# Patient Record
Sex: MALE | Race: WHITE | Employment: OTHER | URBAN - METROPOLITAN AREA
[De-identification: names, ages, dates, MRNs, and addresses within clinical notes are randomized per-mention and may not be internally consistent; named-entity substitution may affect disease eponyms.]

---

## 2017-02-14 ENCOUNTER — OFFICE VISIT (OUTPATIENT)
Dept: FAMILY MEDICINE CLINIC | Age: 62
End: 2017-02-14

## 2017-02-14 VITALS
SYSTOLIC BLOOD PRESSURE: 127 MMHG | HEART RATE: 103 BPM | DIASTOLIC BLOOD PRESSURE: 81 MMHG | OXYGEN SATURATION: 99 % | HEIGHT: 66 IN | RESPIRATION RATE: 18 BRPM | BODY MASS INDEX: 25.95 KG/M2 | WEIGHT: 161.44 LBS | TEMPERATURE: 97.9 F

## 2017-02-14 DIAGNOSIS — M06.9 RHEUMATOID ARTHRITIS, INVOLVING UNSPECIFIED SITE, UNSPECIFIED RHEUMATOID FACTOR PRESENCE: Primary | ICD-10-CM

## 2017-02-14 DIAGNOSIS — G89.29 CHRONIC PAIN OF LEFT KNEE: ICD-10-CM

## 2017-02-14 DIAGNOSIS — M25.562 CHRONIC PAIN OF LEFT KNEE: ICD-10-CM

## 2017-02-14 RX ORDER — LEFLUNOMIDE 20 MG/1
20 TABLET ORAL DAILY
COMMUNITY
End: 2017-03-02 | Stop reason: ALTCHOICE

## 2017-02-14 RX ORDER — TRAMADOL HYDROCHLORIDE 50 MG/1
50 TABLET ORAL
COMMUNITY
End: 2017-07-06 | Stop reason: ALTCHOICE

## 2017-02-14 RX ORDER — NAPROXEN 500 MG/1
500 TABLET ORAL 2 TIMES DAILY WITH MEALS
Qty: 60 TAB | Refills: 5 | Status: SHIPPED | OUTPATIENT
Start: 2017-02-14 | End: 2021-05-03

## 2017-02-14 NOTE — MR AVS SNAPSHOT
Visit Information Date & Time Provider Department Dept. Phone Encounter #  
 2/14/2017  2:00 PM Bushra Hutchins MD [address] [facility] [phone] [id number] Follow-up Instructions Return if symptoms worsen or fail to improve. Upcoming Health Maintenance Date Due Hepatitis C Screening 1955 DTaP/Tdap/Td series (1 - Tdap) 4/3/1976 FOBT Q 1 YEAR AGE 50-75 4/3/2005 ZOSTER VACCINE AGE 60> 4/3/2015 Allergies as of 2/14/2017  Review Complete On: 2/14/2017 By: Bushra Hutchins MD  
  
 Severity Noted Reaction Type Reactions Codeine  02/14/2017    Nausea and Vomiting Current Immunizations  Never Reviewed No immunizations on file. Not reviewed this visit You Were Diagnosed With   
  
 Codes Comments Rheumatoid arthritis, involving unspecified site, unspecified rheumatoid factor presence ([facility]    -  Primary ICD-10-CM: M06.9 ICD-9-CM: 714.0 Chronic pain of left knee     ICD-10-CM: M25.562, G89.29 ICD-9-CM: 719.46, 338.29 Lung nodule < 6cm on CT     ICD-10-CM: R91.1 ICD-9-CM: 793.11 Vitals BP Pulse Temp Resp Height(growth percentile) Weight(growth percentile) 127/81 (BP 1 Location: Left arm, BP Patient Position: Sitting) (!) 103 97.9 °F (36.6 °C) (Oral) 18 5' 6\" (1.676 m) 161 lb 7 oz (73.2 kg) SpO2 BMI Smoking Status 99% 26.06 kg/m2 Current Every Day Smoker Vitals History BMI and BSA Data Body Mass Index Body Surface Area 26.06 kg/m 2 1.85 m 2 Preferred Pharmacy Pharmacy Name Phone [facility] [phone] [facility] [phone] Your Updated Medication List  
  
   
This list is accurate as of: 2/14/17  4:40 PM.  Always use your most recent med list.  
  
  
  
  
 leflunomide 20 mg tablet Commonly known as:  Jeramie Hinsdale Take 20 mg by mouth daily. naproxen 500 mg tablet Commonly known as:  NAPROSYN Take 1 Tab by mouth two (2) times daily (with meals). Indications: RHEUMATOID ARTHRITIS  
  
 traMADol 50 mg tablet Commonly known as:  ULTRAM  
Take 50 mg by mouth every six (6) hours as needed for Pain. Prescriptions Sent to Pharmacy Refills  
 naproxen (NAPROSYN) 500 mg tablet 5 Sig: Take 1 Tab by mouth two (2) times daily (with meals). Indications: RHEUMATOID ARTHRITIS Class: Normal  
 Pharmacy: [facility] [address] [facility] #: [phone] Route: Oral  
  
We Performed the Following REFERRAL TO ORTHOPEDICS [UJF323 Custom] Comments:  
 Please evaluate patient for knee REFERRAL TO PODIATRY [REF90 Custom] REFERRAL TO RHEUMATOLOGY [ILZ18 Custom] Follow-up Instructions Return if symptoms worsen or fail to improve. Referral Information Referral ID Referred By Referred To  
  
 [id number] Oneal MICHAEL MD   
   [address] [facility], [address] [place] [address] Road Phone: [phone] Fax: [phone] Visits Status Start Date End Date 1 New Request 2/14/17 2/14/18 If your referral has a status of pending review or denied, additional information will be sent to support the outcome of this decision. Referral ID Referred By Referred To  
 [id number] [facility], [address]  [address] [place] [address] Visits Status Start Date End Date 1 New Request 2/14/17 2/14/18 If your referral has a status of pending review or denied, additional information will be sent to support the outcome of this decision. Referral ID Referred By Referred To  
 [id number] [facility], [address] Phone: [phone] Fax: [phone] Visits Status Start Date End Date 1 New Request 2/14/17 2/14/18 If your referral has a status of pending review or denied, additional information will be sent to support the outcome of this decision. Introducing [facility] SERVICES! Elisabeth Tavares introduces [employer] patient portal. Now you can access parts of your medical record, email your doctor's office, and request medication refills online. 1. In your internet browser, go to [email or web address] 2. Click on the First Time User? Click Here link in the Sign In box. You will see the New Member Sign Up page. 3. Enter your BioArray Access Code exactly as it appears below. You will not need to use this code after youve completed the sign-up process. If you do not sign up before the expiration date, you must request a new code. · BioArray Access Code: [email or web address] Expires: 5/15/2017  2:13 PM 
 
4. Enter the last four digits of your Social Security Number (xxxx) and Date of Birth (mm/dd/yyyy) as indicated and click Submit. You will be taken to the next sign-up page. 5. Create a BioArray ID. This will be your BioArray login ID and cannot be changed, so think of one that is secure and easy to remember. 6. Create a BioArray password. You can change your password at any time. 7. Enter your Password Reset Question and Answer. This can be used at a later time if you forget your password. 8. Enter your e-mail address. You will receive e-mail notification when new information is available in [address] 9. Click Sign Up. You can now view and download portions of your medical record. 10. Click the Download Summary menu link to download a portable copy of your medical information. If you have questions, please visit the Frequently Asked Questions section of the BioArray website. Remember, BioArray is NOT to be used for urgent needs. For medical emergencies, dial 911. Now available from your iPhone and Android! Please provide this summary of care documentation to your next provider. Your primary care clinician is listed as Annamarie Graves. If you have any questions after today's visit, please call [phone].

## 2017-02-14 NOTE — PROGRESS NOTES
1. Have you been to the ER, urgent care clinic since your last visit? Hospitalized since your last visit? No    2. Have you seen or consulted any other health care providers outside of the [address] [place] [address] since your last visit? Include any pap smears or colon screening. No   Chief Complaint   Patient presents with    New Patient     New Patient- establish care- left knee pain, right foot toes has arthritis knots  & numbness going up right leg       See scanned labs    Pt with RA and has R foot and left knee issues and would like to see rheum, hx of lung nodules-see CT report    Chief Complaint   Patient presents with    New Patient     New Patient- establish care- left knee pain, right foot toes has arthritis knots  & numbness going up right leg     he is a 64y.o. year old male who presents for evalution. Reviewed PmHx, RxHx, FmHx, SocHx, AllgHx and updated and dated in the chart. Patient Active Problem List    Diagnosis    [facility] ([facility]    Lung nodule < 6cm on CT       Review of Systems - negative except as listed above in the HPI    Objective:     Vitals:    02/14/17 1409   BP: 127/81   Pulse: (!) 103   Resp: 18   Temp: 97.9 °F (36.6 °C)   TempSrc: Oral   SpO2: 99%   Weight: 161 lb 7 oz (73.2 kg)   Height: 5' 6\" (1.676 m)     Physical Examination: General appearance - alert, well appearing, and in no distress  Neck - supple, no significant adenopathy  Chest - clear to auscultation, no wheezes, rales or rhonchi, symmetric air entry  Heart - normal rate, regular rhythm, normal S1, S2, no murmurs, rubs, clicks or gallops  Musculoskeletal--left knee with crep, hands with mod deform and nodules, R foot with nodules        Assessment/ Plan:   Kalina Fulton was seen today for new patient.     Diagnoses and all orders for this visit:    Rheumatoid arthritis, involving unspecified site, unspecified rheumatoid factor presence ([facility]  -     REFERRAL TO Jorge Aburto  - naproxen (NAPROSYN) 500 mg tablet; Take 1 Tab by mouth two (2) times daily (with meals). Indications: RHEUMATOID ARTHRITIS    Chronic pain of left knee  -     REFERRAL TO ORTHOPEDICS  -     naproxen (NAPROSYN) 500 mg tablet; Take 1 Tab by mouth two (2) times daily (with meals). Indications: RHEUMATOID ARTHRITIS    Lung nodule < 6cm on CT  -see report  -repeat in one year       Follow-up Disposition:  Return if symptoms worsen or fail to improve. I have discussed the diagnosis with the patient and the intended plan as seen in the above orders. The patient understands and agrees with the plan. The patient has received an after-visit summary and questions were answered concerning future plans. Medication Side Effects and Warnings were discussed with patient  Patient Labs were reviewed and or requested:  Patient Past Records were reviewed and or requested    Subhash Hansen M.D. There are no Patient Instructions on file for this visit.

## 2017-02-27 ENCOUNTER — DOCUMENTATION ONLY (OUTPATIENT)
Dept: FAMILY MEDICINE CLINIC | Age: 62
End: 2017-02-27

## 2017-02-27 NOTE — PROGRESS NOTES
[facility] records ,signed by PCP and sent to central scanning to be scanned into chart.

## 2017-03-02 ENCOUNTER — OFFICE VISIT (OUTPATIENT)
Dept: FAMILY MEDICINE CLINIC | Age: 62
End: 2017-03-02

## 2017-03-02 VITALS
HEIGHT: 66 IN | DIASTOLIC BLOOD PRESSURE: 80 MMHG | SYSTOLIC BLOOD PRESSURE: 120 MMHG | RESPIRATION RATE: 22 BRPM | OXYGEN SATURATION: 97 % | WEIGHT: 159 LBS | BODY MASS INDEX: 25.55 KG/M2 | HEART RATE: 94 BPM | TEMPERATURE: 97.8 F

## 2017-03-02 DIAGNOSIS — J40 BRONCHITIS: Primary | ICD-10-CM

## 2017-03-02 RX ORDER — PREDNISONE 10 MG/1
TABLET ORAL
Qty: 1 PACKAGE | Refills: 0 | Status: SHIPPED | OUTPATIENT
Start: 2017-03-02 | End: 2017-07-06 | Stop reason: ALTCHOICE

## 2017-03-02 RX ORDER — ALBUTEROL SULFATE 90 UG/1
2 AEROSOL, METERED RESPIRATORY (INHALATION)
Qty: 1 INHALER | Refills: 5 | Status: SHIPPED | OUTPATIENT
Start: 2017-03-02 | End: 2017-07-06

## 2017-03-02 RX ORDER — CEFDINIR 300 MG/1
300 CAPSULE ORAL 2 TIMES DAILY
Qty: 20 CAP | Refills: 0 | Status: SHIPPED | OUTPATIENT
Start: 2017-03-02 | End: 2017-03-12

## 2017-03-02 NOTE — PROGRESS NOTES
Nasal congestion , cough x 3 weeks. [facility] to [facility], on prednisone dose pack, ending tomorrow, antibiotics will end on 3/8/2017, Bactrum. Patient states he has already taken amoxicillin x 10 days prior to current regimen. 1. Have you been to the ER, urgent care clinic since your last visit? Hospitalized since your last visit? No    2. Have you seen or consulted any other health care providers outside of the [address] [place] [address] since your last visit? Include any pap smears or colon screening. No       Chief Complaint   Patient presents with    Nasal Congestion     on 2 nd round of antibiotics, cough congestion x 3 weeks. he is a 64y.o. year old male who presents for evalution. Reviewed PmHx, RxHx, FmHx, SocHx, AllgHx and updated and dated in the chart. Patient Active Problem List    Diagnosis    Rheumatoid arthritis ([facility]    Lung nodule < 6cm on CT       Review of Systems - negative except as listed above in the HPI    Objective:     Vitals:    03/02/17 1445   BP: 120/80   Pulse: 94   Resp: 22   Temp: 97.8 °F (36.6 °C)   SpO2: 97%   Weight: 159 lb (72.1 kg)   Height: 5' 6\" (1.676 m)     Physical Examination: General appearance - alert, well appearing, and in no distress  Chest - rhonchi noted R side  Heart - normal rate, regular rhythm, normal S1, S2, no murmurs, rubs, clicks or gallops      Assessment/ Plan:   Geraldine Nunez was seen today for nasal congestion. Diagnoses and all orders for this visit:    Bronchitis  -     cefdinir (OMNICEF) 300 mg capsule; Take 1 Cap by mouth two (2) times a day for 10 days. -     predniSONE (STERAPRED DS) 10 mg dose pack; 12 day DS taper pack as directed  -     albuterol (PROVENTIL HFA, VENTOLIN HFA, PROAIR HFA) 90 mcg/actuation inhaler; Take 2 Puffs by inhalation every four (4) hours as needed for Wheezing for up to 360 days. Follow-up Disposition:  Return if symptoms worsen or fail to improve.     I have discussed the diagnosis with the patient and the intended plan as seen in the above orders. The patient understands and agrees with the plan. The patient has received an after-visit summary and questions were answered concerning future plans. Medication Side Effects and Warnings were discussed with patient  Patient Labs were reviewed and or requested:  Patient Past Records were reviewed and or requested    Antonio Rodrigues M.D. There are no Patient Instructions on file for this visit.           \

## 2017-03-02 NOTE — MR AVS SNAPSHOT
Visit Information Date & Time Provider Department Dept. Phone Encounter #  
 3/2/2017  2:40 PM Jayy Guevara MD [address] [facility] [phone] [id number] Follow-up Instructions Return if symptoms worsen or fail to improve. Upcoming Health Maintenance Date Due Hepatitis C Screening 1955 Pneumococcal 19-64 Medium Risk (1 of 1 - PPSV23) 4/3/1974 DTaP/Tdap/Td series (1 - Tdap) 4/3/1976 FOBT Q 1 YEAR AGE 50-75 4/3/2005 ZOSTER VACCINE AGE 60> 4/3/2015 Allergies as of 3/2/2017  Review Complete On: 3/2/2017 By: Jayy Guevara MD  
  
 Severity Noted Reaction Type Reactions Codeine  02/14/2017    Nausea and Vomiting Current Immunizations  Never Reviewed No immunizations on file. Not reviewed this visit You Were Diagnosed With   
  
 Codes Comments Bronchitis    -  Primary ICD-10-CM: P50 ICD-9-CM: 528 Vitals BP  
  
  
  
  
  
 120/80 Vitals History BMI and BSA Data Body Mass Index Body Surface Area  
 25.66 kg/m 2 1.83 m 2 Preferred Pharmacy Pharmacy Name Phone [facility] [address] AT [facility] OF  [facility] [phone] Your Updated Medication List  
  
   
This list is accurate as of: 3/2/17  3:07 PM.  Always use your most recent med list.  
  
  
  
  
 albuterol 90 mcg/actuation inhaler Commonly known as:  PROVENTIL HFA, VENTOLIN HFA, PROAIR HFA Take 2 Puffs by inhalation every four (4) hours as needed for Wheezing for up to 360 days. cefdinir 300 mg capsule Commonly known as:  OMNICEF Take 1 Cap by mouth two (2) times a day for 10 days. naproxen 500 mg tablet Commonly known as:  NAPROSYN Take 1 Tab by mouth two (2) times daily (with meals). Indications: RHEUMATOID ARTHRITIS  
  
 predniSONE 10 mg dose pack Commonly known as:  STERAPRED DS  
12 day DS taper pack as directed  
  
 traMADol 50 mg tablet Commonly known as:  ULTRAM  
Take 50 mg by mouth every six (6) hours as needed for Pain. Prescriptions Printed Refills  
 cefdinir (OMNICEF) 300 mg capsule 0 Sig: Take 1 Cap by mouth two (2) times a day for 10 days. Class: Print Route: Oral  
 predniSONE (STERAPRED DS) 10 mg dose pack 0 Si day DS taper pack as directed Class: Print  
 albuterol (PROVENTIL HFA, VENTOLIN HFA, PROAIR HFA) 90 mcg/actuation inhaler 5 Sig: Take 2 Puffs by inhalation every four (4) hours as needed for Wheezing for up to 360 days. Class: Print Route: Inhalation Follow-up Instructions Return if symptoms worsen or fail to improve. Introducing [facility]! Lissy Cuevas introduces [employer] patient portal. Now you can access parts of your medical record, email your doctor's office, and request medication refills online. 1. In your internet browser, go to [email or web address] 2. Click on the First Time User? Click Here link in the Sign In box. You will see the New Member Sign Up page. 3. Enter your [employer] Access Code exactly as it appears below. You will not need to use this code after youve completed the sign-up process. If you do not sign up before the expiration date, you must request a new code. · [employer] Access Code: [email or web address] Expires: 5/15/2017  2:13 PM 
 
4. Enter the last four digits of your Social Security Number (xxxx) and Date of Birth (mm/dd/yyyy) as indicated and click Submit. You will be taken to the next sign-up page. 5. Create a Clutch.iot ID. This will be your [employer] login ID and cannot be changed, so think of one that is secure and easy to remember. 6. Create a [employer]utch[employer]iot password. You can change your password at any time. 7. Enter your Password Reset Question and Answer. This can be used at a later time if you forget your password. 8. Enter your e-mail address.  You will receive e-mail notification when new information is available in [employer]. 9. Click Sign Up. You can now view and download portions of your medical record. 10. Click the Download Summary menu link to download a portable copy of your medical information. If you have questions, please visit the Frequently Asked Questions section of the [employer] website. Remember, Un[employer] is NOT to be used for urgent needs. For medical emergencies, dial 911. Now available from your iPhone and Android! Please provide this summary of care documentation to your next provider. Your primary care clinician is listed as Elan Andrea. If you have any questions after today's visit, please call [phone].

## 2017-07-03 ENCOUNTER — TELEPHONE (OUTPATIENT)
Dept: RHEUMATOLOGY | Age: 62
End: 2017-07-03

## 2017-07-06 ENCOUNTER — OFFICE VISIT (OUTPATIENT)
Dept: RHEUMATOLOGY | Age: 62
End: 2017-07-06

## 2017-07-06 VITALS
SYSTOLIC BLOOD PRESSURE: 118 MMHG | HEART RATE: 94 BPM | OXYGEN SATURATION: 97 % | HEIGHT: 66 IN | WEIGHT: 167 LBS | BODY MASS INDEX: 26.84 KG/M2 | RESPIRATION RATE: 18 BRPM | DIASTOLIC BLOOD PRESSURE: 99 MMHG | TEMPERATURE: 96.7 F

## 2017-07-06 DIAGNOSIS — R59.0 HILAR LYMPHADENOPATHY: ICD-10-CM

## 2017-07-06 DIAGNOSIS — M19.041 PRIMARY OSTEOARTHRITIS OF BOTH HANDS: ICD-10-CM

## 2017-07-06 DIAGNOSIS — Z72.0 TOBACCO ABUSE: ICD-10-CM

## 2017-07-06 DIAGNOSIS — J84.10 PULMONARY FIBROSIS (HCC): ICD-10-CM

## 2017-07-06 DIAGNOSIS — M19.042 PRIMARY OSTEOARTHRITIS OF BOTH HANDS: ICD-10-CM

## 2017-07-06 DIAGNOSIS — M05.79 SEROPOSITIVE RHEUMATOID ARTHRITIS OF MULTIPLE SITES (HCC): Primary | ICD-10-CM

## 2017-07-06 NOTE — PROGRESS NOTES
REASON FOR VISIT    This is the initial evaluation for Mr. Rolene Boeck a 58 y.o.  male for question of an inflammatory arthritis. The patient is referred to the Arthritis and [address] [place] [address] at the request of Dr. Samanta Resendiz. HISTORY OF PRESENT ILLNESS      I have reviewed and summarized old records from [address] [place] [address] and [facility]    In 2016, he developed painful and nodular 1st MTP. He had tingling that extending proximally. He saw podiatry who prescribed him medication which did not help but he does not remember. He is a smoker of 40 years. In 5/03/2016, labs showed positive rheumatoid 527, negative BRIAN direct, Lyme, uric acid 6.0 mg/dL    In 5/06/2016, CT Low Dose Cancer screening without contrast showed mild nonspecific left apical opacities may represent scarring. There are three 4 mm lung nodules (series 4, images 197, 79, 65). Fibrotic changes seen in the lungs. Nonspecific left upper lobe longitudinal subpleural opacity may represent postsurgical change or scarring. No other significant lung abnormality. The central airways are patent. Enlarged right hilar lymph node measuring 16 mm (series 3, image 113). There are several other prominent but nonenlarged mediastinal lymph nodes. No pleural or pericardial effusion. Slightly nodular contour of the liver. Mild nonspecific thickening of the left adrenal gland. Small hiatal hernia. Small right renal calculi. No destructive osseous lesion. In 7/18/2016, he was evaluated by [facility] Rheumatology. He had some joint pains. Labs showed positive anti-CCP >300, CRP 0.6 mg//L, negative HCV, HBsAg, HBsAb. He was started on prednisone, which helped. In 8/15/2016, he followed with rheumatology and was started on leflunomide. He tolerated lelfunomide but stopped it after around 3 weeks due to fear of side effects. In 3/02/2017, he was given a prednisone dose pack for an upper respiratory tract infection prior to his joint pain.     In 3/2017, he developed pain, swelling, swelling lasting 2 hours in hands and wrists joints. He has taken naproxen 500 mg twice daily. He developed nodules. Today, he feels stiffness lasting hours, pain and swelling in his hands which he felt has improved. He also has nodules that have been painless but growing. He continues to smoke. Therapy History includes:    Current DMARD therapy includes: none  Prior DMARD therapy includes: leflunomide  The following DMARDs have been ineffective: none  The following DMARDs were stopped because of side effects: none    REVIEW OF SYSTEMS    A 15 point review of systems was performed and summarized below. The questionnaire was reviewed with the patient and scanned into the patient's medical record.     General: denies recent weight gain, recent weight loss, fatigue, weakness, fever, night sweats  Musculoskeletal: endorses joint pain, joint swelling, morning stiffness (lasting 120 minutes), denies muscle weakness  Ears: denies ringing in ears, loss of hearing, deafness  Eyes: endorses loss of vision, denies pain, redness, double vision, blurred vision, dryness, foreign body sensation  Mouth: denies sore tongue, oral ulcers, bleeding gums, loss of taste, dryness, increased dental caries  Nose: denies nosebleeds, loss of smell, nasal ulcers  Throat: denies frequent sore throats, hoarseness, difficulty in swallowing, pain in jaw while chewing  Neck: denies swollen glands, tender glands  Cardiopulmonary: denies pain in chest, irregular heart beat, sudden changes in heart beat, shortness of breath, difficulty breathing at night, swollen legs or feet, cough, coughing of blood, wheezing  Gastrointestinal: denies nausea, heartburn, stomach pain relieved by food, vomiting of blood/\"coffee grounds\", jaundice, increasing constipation, persistent diarrhea, blood in stools, black stools  Genitourinary: denies getting up at night to pass urine, difficult urination, pain or burning on urination, blood in urine, cloudy urine, pus in urine, genital discharge, frequent urination, rash/ulcers, sexual difficulties, prostate trouble  Hematologic: denies anemia, bleeding tendency, blood clots  Skin: endorses nodules/bumps, denies easy bruising, redness, rash, hives, sun sensitive, skin tightness, hair loss, color changes of hands or feet in the cold (Raynaud's)  Neurologic: endorses numbness or tingling in hands/feet, denies headaches, dizziness, fainting of loss of consciousness,  memory loss, muscle weakness  Psychiatric: denies depression, excessive worries  Sleep: denies poor sleep (7 hours), snoring, daytime somnolence, difficulty falling asleep, difficulty staying asleep     PAST MEDICAL HISTORY    He has a past medical history of Rheumatoid arthritis ([facility] [address] FAMILY HISTORY    His family history includes Arthritis-rheumatoid in his cousin; Cancer in his mother; Glaucoma in his mother; Kidney Disease in his father. SOCIAL HISTORY    He reports that he has been smoking. He has never used smokeless tobacco. He reports that he does not drink alcohol or use illicit drugs. HEALTH MAINTENANCE    Immunizations    There is no immunization history on file for this patient. MEDICATIONS    Current Outpatient Prescriptions   Medication Sig Dispense Refill    naproxen (NAPROSYN) 500 mg tablet Take 1 Tab by mouth two (2) times daily (with meals). Indications: RHEUMATOID ARTHRITIS 60 Tab 5       ALLERGIES    Allergies   Allergen Reactions    Codeine Nausea and Vomiting       PHYSICAL EXAMINATION    Visit Vitals    BP (!) 118/99 (BP 1 Location: Right arm, BP Patient Position: Sitting)    Pulse 94    Temp 96.7 °F (35.9 °C)    Resp 18    Ht 5' 6\" (1.676 m)    Wt 167 lb (75.8 kg)    SpO2 97%    BMI 26.95 kg/m2     Body mass index is 26.95 kg/(m^2).     General: Patient is alert, oriented x 3, not in acute distress    HEENT:   Conjunctiva are not injected and appear moist, oral mucous membranes are moist, there are no ulcers present, there is no alopecia, neck is supple, there is no lymphadenopathy. Salivary glands are normal    Cardiovascular:  Heart is regular rate and rhythm, no murmurs. Chest:  Bibasilar crackles    Extremities:  Free of clubbing, cyanosis, edema, extremities well perfused. Neurological exam:  No focal sensory deficits, muscle strength is full in upper and lower extremities. Skin exam:  There are no rashes, no tophi, no psoriasis, no active Raynaud's, no livedo reticularis, no periungual erythema. Rheumatoid Nodules: LEFT: olecranon, IP, 1st MTP and 5th MTP. RIGHT: IP, 3rd IP, 1st MTP, 1st IP and 5th proximal MT     Musculoskeletal exam:  A comprehensive musculoskeletal exam was performed for all joints of each upper and lower extremity and assessed for swelling, tenderness and range of motion. Pertinent results are documented as below:    Bilateral [place] and [place] nodes.     Joint Count 7/6/2017   Patient pain (0-100) 75   MHAQ 0.125   Left wrist- Swollen 1   Left 1st MCP - Tender 1   Left 1st MCP - Swollen 1   Left 2nd MCP - Tender 1   Left 2nd MCP - Swollen 1   Left 3rd MCP - Swollen 1   Left 5th MCP - Swollen 1   Left 2nd PIP - Swollen 1   Left 3rd PIP - Swollen 1   Left 4th PIP - Swollen 1   Left 5th PIP - Swollen 1   Right wrist- Swollen 1   Right 1st MCP - Tender 1   Right 1st MCP - Swollen 1   Right 2nd MCP - Tender 1   Right 2nd MCP - Swollen 1   Right 3rd MCP - Tender 1   Right 3rd MCP - Swollen 1   Right 4th MCP - Tender 1   Right 4th MCP - Swollen 1   Right 5th MCP - Tender 1   Right 5th MCP - Swollen 1   Right 2nd PIP - Swollen 1   Right 3rd PIP - Swollen 1   Right 4th PIP - Tender 1   Right 4th PIP - Swollen 1   Right 5th PIP - Swollen 1   Tender Joint Count (Total) 8   Swollen Joint Count (Total) 19   Physician Assessment (0-10) 5     DATA REVIEW    Prior medical records were reviewed and are summarized as below:    Laboratory data: summarized in the HPI    Imaging: summarized in the HPI. ASSESSMENT AND PLAN    1) Seropositive Erosive Nodular Rheumatoid Arthritis. I had a long discussion with him about the importance of DMARD treatment. He had been on leflunomide with good tolerance but stopped it out of fear of immunosuppression and not because of side effects. Either it or methotrexate would be a good option if no renal impairment. I discussed with him the risk for smoking and the poor prognostic factor associated with it. I also counseled against surgical removal of this nodules due to risk for infection. I offered prednisone but he declined. I ordered labs and radiographs and will have him follow up in 2 weeks to discuss DMARDs. 2) Pulmonary Fibrosis. This was seen incidentally on a CT chest cancer screening. The description was non-specific and just \"fibrotic changes seen in lungs\". No mention of location or pattern. The question is whether this is from smoking, inhaled chemicals from work, or due to Rheumatoid Arthritis. I would like a high resolution CT chest but he declined. 3) Tobacco Abuse. He is a smoker of 40 years. I counseled smoking cessation due to #1. 4) Hilar Lymphoadenopathy. Chest CT showed enlarged right hilar lymph node measuring 16 mm. There are several other prominent but nonenlarged mediastinal lymph nodes. He declined high resolution CT chest.    5) Bilateral Hand Osteoarthritis. The patient has osteoarthritis, which is also known as \"wear and tear arthritis,\" non-inflammatory arthritis or mechanical arthritis. There are hereditary, vocational and posttraumatic joint injuries predisposing factors. I recommend non-pharmacologic modalities such as keeping hands warm, avoid activities that case pain, warm water soaks, in addition to (2) pharmacologic modalities such as acetaminophen as first line, oral and topical NSAIDs as second line.  Naproxen is a low WATERS-2 selectivity inhibitor that poses lower cardiovascular risk than other NSAIDs (Angiolillo Francesco LOPEZ. Clinical Pharmacology and Cardiovascular Safety of Naproxen. Am J Cardiovasc Drugs. 2016 Nov 8). NSAIDs should not be used in patients on blood thinners, chronic kidney disease, high risk coronary artery disease, and inflammatory bowel disease (ulcerative colitis or Crohn's disease)      The patient voiced understanding of the aforementioned assessment and plan. Summary of plan was provided in the After Visit Summary patient instructions. I also provided education about MyChart setup and utility.     TODAY'S ORDERS    Orders Placed This Encounter    QUANTIFERON TB GOLD    XR FOOT LT MIN 3 V    XR FOOT RT MIN 3 V    XR HAND LT MIN 3 V    XR HAND RT MIN 3 V    CBC WITH AUTOMATED DIFF    CHRONIC HEPATITIS PANEL    METABOLIC PANEL, COMPREHENSIVE    C REACTIVE PROTEIN, QT    SED RATE (ESR)    PROTEIN ELECTROPHORESIS W/ REFLX OFELIA       Future Appointments  Date Time Provider Kelton Georgie   7/20/2017 8:40 AM MD Daniel Varma MD, [address] [facility]    Adult Rheumatology   Musculoskeletal Ultrasound Certified  [address] [place] [address], [address] [place] [address]   Phone [phone]  Fax [phone]

## 2017-07-06 NOTE — MR AVS SNAPSHOT
Visit Information Date & Time Provider Department Dept. Phone Encounter #  
 7/6/2017 11:00 AM Jadiel Burns MD Arthritis and [address] [place] [address] [id number] Follow-up Instructions Return in about 2 weeks (around 7/20/2017). Upcoming Health Maintenance Date Due Hepatitis C Screening 1955 Pneumococcal 19-64 Medium Risk (1 of 1 - PPSV23) 4/3/1974 DTaP/Tdap/Td series (1 - Tdap) 4/3/1976 FOBT Q 1 YEAR AGE 50-75 4/3/2005 ZOSTER VACCINE AGE 60> 4/3/2015 INFLUENZA AGE 9 TO ADULT 8/1/2017 Allergies as of 7/6/2017  Review Complete On: 7/6/2017 By: Jadiel Burns MD  
  
 Severity Noted Reaction Type Reactions Codeine  02/14/2017    Nausea and Vomiting Current Immunizations  Never Reviewed No immunizations on file. Not reviewed this visit You Were Diagnosed With   
  
 Codes Comments Seropositive rheumatoid arthritis of multiple sites [facility])    -  Primary ICD-10-CM: M05.79 ICD-9-CM: 714.0 Vitals BP Pulse Temp Resp Height(growth percentile) Weight(growth percentile) (!) 118/99 (BP 1 Location: Right arm, BP Patient Position: Sitting) 94 96.7 °F (35.9 °C) 18 5' 6\" (1.676 m) 167 lb (75.8 kg) SpO2 BMI Smoking Status 97% 26.95 kg/m2 Current Every Day Smoker BMI and BSA Data Body Mass Index Body Surface Area  
 26.95 kg/m 2 1.88 m 2 Preferred Pharmacy Pharmacy Name Phone [facility], [address] AT [facility] OF  [facility] [phone] Your Updated Medication List  
  
   
This list is accurate as of: 7/6/17 11:57 AM.  Always use your most recent med list.  
  
  
  
  
 naproxen 500 mg tablet Commonly known as:  NAPROSYN Take 1 Tab by mouth two (2) times daily (with meals). Indications: RHEUMATOID ARTHRITIS We Performed the Following C REACTIVE PROTEIN, QT [13194 CPT(R)] CBC WITH AUTOMATED DIFF [37930 CPT(R)] CHRONIC HEPATITIS PANEL [[id number] Custom] METABOLIC PANEL, COMPREHENSIVE [67683 CPT(R)] PROTEIN ELECTROPHORESIS W/ REFLX OFELIA [[id number] Custom] QUANTIFERON TB GOLD [[id number] Custom] SED RATE (ESR) [id number] CPT(R)] Follow-up Instructions Return in about 2 weeks (around 7/20/2017). To-Do List   
 07/06/2017 Imaging:  XR FOOT LT MIN 3 V   
  
 07/06/2017 Imaging:  XR FOOT RT MIN 3 V   
  
 07/06/2017 Imaging:  XR HAND LT MIN 3 V   
  
 07/06/2017 Imaging:  XR HAND RT MIN 3 V Introducing [facility] & HEALTH SERVICES! [employer] introduces [employer] patient portal. Now you can access parts of your medical record, email your doctor's office, and request medication refills online. 1. In your internet browser, go to [email or web address] 2. Click on the First Time User? Click Here link in the Sign In box. You will see the New Member Sign Up page. 3. Enter your [employer] Access Code exactly as it appears below. You will not need to use this code after youve completed the sign-up process. If you do not sign up before the expiration date, you must request a new code. · [employer] Access Code: [email or web address] Expires: 10/4/2017 11:57 AM 
 
4. Enter the last four digits of your Social Security Number (xxxx) and Date of Birth (mm/dd/yyyy) as indicated and click Submit. You will be taken to the next sign-up page. 5. Create a comScore ID. This will be your comScore login ID and cannot be changed, so think of one that is secure and easy to remember. 6. Create a [employer] password. You can change your password at any time. 7. Enter your Password Reset Question and Answer. This can be used at a later time if you forget your password. 8. Enter your e-mail address. You will receive e-mail notification when new information is available in [address] 9. Click Sign Up. You can now view and download portions of your medical record. 10. Click the Download Summary menu link to download a portable copy of your medical information. If you have questions, please visit the Frequently Asked Questions section of the Palm website. Remember, Palm is NOT to be used for urgent needs. For medical emergencies, dial 911. Now available from your iPhone and Android! Please provide this summary of care documentation to your next provider. Your primary care clinician is listed as LEONILA MICHAEL. If you have any questions after today's visit, please call [phone].

## 2017-07-07 LAB
COMMENT, 144067: NORMAL
HBV CORE AB SERPL QL IA: NEGATIVE
HBV CORE IGM SERPL QL IA: NEGATIVE
HBV E AB SERPL QL IA: NEGATIVE
HBV E AG SERPL QL IA: NEGATIVE
HBV SURFACE AB SER QL: NON REACTIVE
HBV SURFACE AG SERPL QL IA: NEGATIVE
HCV AB S/CO SERPL IA: <0.1 S/CO RATIO (ref 0–0.9)

## 2017-07-10 LAB
ALBUMIN SERPL ELPH-MCNC: 4.1 G/DL (ref 2.9–4.4)
ALBUMIN SERPL-MCNC: 4.4 G/DL (ref 3.6–4.8)
ALBUMIN/GLOB SERPL: 1.3 {RATIO} (ref 0.7–1.7)
ALBUMIN/GLOB SERPL: 1.5 {RATIO} (ref 1.2–2.2)
ALP SERPL-CCNC: 127 IU/L (ref 39–117)
ALPHA1 GLOB SERPL ELPH-MCNC: 0.2 G/DL (ref 0–0.4)
ALPHA2 GLOB SERPL ELPH-MCNC: 0.7 G/DL (ref 0.4–1)
ALT SERPL-CCNC: 21 IU/L (ref 0–44)
ANNOTATION COMMENT IMP: NORMAL
AST SERPL-CCNC: 23 IU/L (ref 0–40)
B-GLOBULIN SERPL ELPH-MCNC: 1.3 G/DL (ref 0.7–1.3)
BASOPHILS # BLD AUTO: 0 X10E3/UL (ref 0–0.2)
BASOPHILS NFR BLD AUTO: 0 %
BILIRUB SERPL-MCNC: <0.2 MG/DL (ref 0–1.2)
BUN SERPL-MCNC: 16 MG/DL (ref 8–27)
BUN/CREAT SERPL: 16 (ref 10–24)
CALCIUM SERPL-MCNC: 10 MG/DL (ref 8.6–10.2)
CHLORIDE SERPL-SCNC: 104 MMOL/L (ref 96–106)
CO2 SERPL-SCNC: 20 MMOL/L (ref 18–29)
CREAT SERPL-MCNC: 0.98 MG/DL (ref 0.76–1.27)
CRP SERPL-MCNC: 5.1 MG/L (ref 0–4.9)
EOSINOPHIL # BLD AUTO: 0.2 X10E3/UL (ref 0–0.4)
EOSINOPHIL NFR BLD AUTO: 2 %
ERYTHROCYTE [DISTWIDTH] IN BLOOD BY AUTOMATED COUNT: 13.6 % (ref 12.3–15.4)
ERYTHROCYTE [SEDIMENTATION RATE] IN BLOOD BY WESTERGREN METHOD: 21 MM/HR (ref 0–30)
GAMMA GLOB SERPL ELPH-MCNC: 1 G/DL (ref 0.4–1.8)
GAMMA INTERFERON BACKGROUND BLD IA-ACNC: 0.03 IU/ML
GLOBULIN SER CALC-MCNC: 2.9 G/DL (ref 1.5–4.5)
GLOBULIN SER CALC-MCNC: 3.2 G/DL (ref 2.2–3.9)
GLUCOSE SERPL-MCNC: 87 MG/DL (ref 65–99)
HCT VFR BLD AUTO: 48.3 % (ref 37.5–51)
HGB BLD-MCNC: 16.4 G/DL (ref 12.6–17.7)
IMM GRANULOCYTES # BLD: 0 X10E3/UL (ref 0–0.1)
IMM GRANULOCYTES NFR BLD: 0 %
LYMPHOCYTES # BLD AUTO: 1.6 X10E3/UL (ref 0.7–3.1)
LYMPHOCYTES NFR BLD AUTO: 18 %
M PROTEIN SERPL ELPH-MCNC: NORMAL G/DL
M TB IFN-G BLD-IMP: NEGATIVE
M TB IFN-G CD4+ BCKGRND COR BLD-ACNC: <0 IU/ML
M TB IFN-G CD4+ T-CELLS BLD-ACNC: 0.02 IU/ML
MCH RBC QN AUTO: 32.3 PG (ref 26.6–33)
MCHC RBC AUTO-ENTMCNC: 34 G/DL (ref 31.5–35.7)
MCV RBC AUTO: 95 FL (ref 79–97)
MITOGEN IGNF BLD-ACNC: 9.87 IU/ML
MONOCYTES # BLD AUTO: 0.9 X10E3/UL (ref 0.1–0.9)
MONOCYTES NFR BLD AUTO: 10 %
NEUTROPHILS # BLD AUTO: 6.4 X10E3/UL (ref 1.4–7)
NEUTROPHILS NFR BLD AUTO: 70 %
PLATELET # BLD AUTO: 320 X10E3/UL (ref 150–379)
PLEASE NOTE, 011150: NORMAL
POTASSIUM SERPL-SCNC: 4.5 MMOL/L (ref 3.5–5.2)
PROT PATTERN SERPL ELPH-IMP: NORMAL
PROT SERPL-MCNC: 7.3 G/DL (ref 6–8.5)
QUANTIFERON INCUBATION: NORMAL
RBC # BLD AUTO: 5.07 X10E6/UL (ref 4.14–5.8)
SERVICE CMNT-IMP: NORMAL
SODIUM SERPL-SCNC: 142 MMOL/L (ref 134–144)
WBC # BLD AUTO: 9.2 X10E3/UL (ref 3.4–10.8)

## 2017-07-11 ENCOUNTER — TELEPHONE (OUTPATIENT)
Dept: RHEUMATOLOGY | Age: 62
End: 2017-07-11

## 2017-07-11 NOTE — PROGRESS NOTES
The results were reviewed and a letter was sent. ALK slightly elevated and CRP elevated. All remaining labs are normal/negative.

## 2017-07-11 NOTE — TELEPHONE ENCOUNTER
Left VM on this patient's home/cell phone to R/S his appointment on July 20, 2017 to July 19, 2017 at 8:00am.

## 2017-07-19 ENCOUNTER — OFFICE VISIT (OUTPATIENT)
Dept: RHEUMATOLOGY | Age: 62
End: 2017-07-19

## 2017-07-19 VITALS
RESPIRATION RATE: 18 BRPM | DIASTOLIC BLOOD PRESSURE: 87 MMHG | BODY MASS INDEX: 27 KG/M2 | HEIGHT: 66 IN | SYSTOLIC BLOOD PRESSURE: 156 MMHG | TEMPERATURE: 95.9 F | OXYGEN SATURATION: 98 % | HEART RATE: 89 BPM | WEIGHT: 168 LBS

## 2017-07-19 DIAGNOSIS — Z72.0 TOBACCO ABUSE: ICD-10-CM

## 2017-07-19 DIAGNOSIS — J84.10 PULMONARY FIBROSIS (HCC): ICD-10-CM

## 2017-07-19 DIAGNOSIS — M05.79 SEROPOSITIVE RHEUMATOID ARTHRITIS OF MULTIPLE SITES (HCC): Primary | ICD-10-CM

## 2017-07-19 DIAGNOSIS — M19.042 PRIMARY OSTEOARTHRITIS OF BOTH HANDS: ICD-10-CM

## 2017-07-19 DIAGNOSIS — M19.041 PRIMARY OSTEOARTHRITIS OF BOTH HANDS: ICD-10-CM

## 2017-07-19 DIAGNOSIS — R59.0 HILAR LYMPHADENOPATHY: ICD-10-CM

## 2017-07-19 PROBLEM — M06.9 RHEUMATOID ARTHRITIS (HCC): Status: RESOLVED | Noted: 2017-02-14 | Resolved: 2017-07-19

## 2017-07-19 RX ORDER — LEFLUNOMIDE 20 MG/1
20 TABLET ORAL DAILY
Qty: 90 TAB | Refills: 0 | Status: SHIPPED | OUTPATIENT
Start: 2017-07-19 | End: 2017-10-17

## 2017-07-19 NOTE — PATIENT INSTRUCTIONS
ARAVA (leflunomide)    I prescribed you Arava (leflunomide) 20 mg tablets. Please start with half a tablet (10 mg) daily for 7 days and if you have no side effects, such as diarrhea or upset stomach, please increase to one full tablet daily (20 mg). Potential Adverse Affects    - Nausea  - Vomiting  - Upset stomach  - Diarrhea  - Oral ulcers  - Hair thinning  - Infection  - Lliver function abnormalities  - Blood count abnormalities    Medication Monitoring    You will need routine blood counts and kidney and liver testing as a measure of monitoring for long term use of immunosuppressants. Things You Should Do    You should avoid ill contacts     You should get annual influenza vaccines and the pneumonia vaccines. You should apply SPF 50 sunscreen when out in the sun. You should avoid alcohol as it may hasten hepatotoxicity. Leflunomide may take 4 to 12 weeks to be effective. If you have any problems or side effects with this medication, please call me immediately to inform me.

## 2017-07-19 NOTE — MR AVS SNAPSHOT
Visit Information Date & Time Provider Department Dept. Phone Encounter #  
 7/19/2017  8:00 AM Mk Brower MD [facility] [id number] Follow-up Instructions Return in about 4 weeks (around 8/16/2017). Upcoming Health Maintenance Date Due Pneumococcal 19-64 Medium Risk (1 of 1 - PPSV23) 4/3/1974 DTaP/Tdap/Td series (1 - Tdap) 4/3/1976 FOBT Q 1 YEAR AGE 50-75 4/3/2005 ZOSTER VACCINE AGE 60> 4/3/2015 INFLUENZA AGE 9 TO ADULT 8/1/2017 Allergies as of 7/19/2017  Review Complete On: 7/19/2017 By: Mk Brower MD  
  
 Severity Noted Reaction Type Reactions Codeine  02/14/2017    Nausea and Vomiting Current Immunizations  Never Reviewed No immunizations on file. Not reviewed this visit You Were Diagnosed With   
  
 Codes Comments Seropositive rheumatoid arthritis of multiple sites [facility])    -  Primary ICD-10-CM: M05.79 ICD-9-CM: 714.0 Hilar lymphadenopathy     ICD-10-CM: R59.0 ICD-9-CM: 785.6 Pulmonary fibrosis ([facility] [address]     ICD-10-CM: J84.10 ICD-9-CM: 837 Tobacco abuse     ICD-10-CM: Z72.0 ICD-9-CM: 305.1 Primary osteoarthritis of both hands     ICD-10-CM: M19.041, U82.758 ICD-9-CM: 715.14 Vitals BP Pulse Temp Resp Height(growth percentile) Weight(growth percentile) 156/87 (BP 1 Location: Right arm, BP Patient Position: Sitting) 89 95.9 °F (35.5 °C) 18 5' 6\" (1.676 m) 168 lb (76.2 kg) SpO2 BMI Smoking Status 98% 27.12 kg/m2 Current Every Day Smoker Vitals History BMI and BSA Data Body Mass Index Body Surface Area  
 27.12 kg/m 2 1.88 m 2 Preferred Pharmacy Pharmacy Name Phone [facility] [address], [address] AT [facility] OF  [facility] [phone] Your Updated Medication List  
  
   
This list is accurate as of: 7/19/17  8:26 AM.  Always use your most recent med list.  
  
 leflunomide 20 mg tablet Commonly known as:  Keith Room Take 1 Tab by mouth daily for 90 days. Take half tab daily for 7 days and then one tab if tolerated  
  
 naproxen 500 mg tablet Commonly known as:  NAPROSYN Take 1 Tab by mouth two (2) times daily (with meals). Indications: RHEUMATOID ARTHRITIS Prescriptions Sent to Pharmacy Refills  
 leflunomide (ARAVA) 20 mg tablet 0 Sig: Take 1 Tab by mouth daily for 90 days. Take half tab daily for 7 days and then one tab if tolerated Class: Normal  
 Pharmacy: [facility] [address] [facility] #: [phone] Route: Oral  
  
Follow-up Instructions Return in about 4 weeks (around 8/16/2017). Patient Instructions ARAVA (leflunomide) I prescribed you Arava (leflunomide) 20 mg tablets. Please start with half a tablet (10 mg) daily for 7 days and if you have no side effects, such as diarrhea or upset stomach, please increase to one full tablet daily (20 mg). Potential Adverse Affects 
 
- Nausea - Vomiting - Upset stomach 
- Diarrhea 
- Oral ulcers 
- Hair thinning - Infection - Lliver function abnormalities - Blood count abnormalities Medication Monitoring You will need routine blood counts and kidney and liver testing as a measure of monitoring for long term use of immunosuppressants. Things You Should Do You should avoid ill contacts You should get annual influenza vaccines and the pneumonia vaccines. You should apply SPF 50 sunscreen when out in the sun. You should avoid alcohol as it may hasten hepatotoxicity. Leflunomide may take 4 to 12 weeks to be effective. If you have any problems or side effects with this medication, please call me immediately to inform me. Introducing \[facility]\"" & HEALTH SERVICES!    
 Abisai Mcdonald introduces [employer] patient portal. Now you can access parts of your medical record, email your doctor's office, and request medication refills online. 1. In your internet browser, go to [email or web address] 2. Click on the First Time User? Click Here link in the Sign In box. You will see the New Member Sign Up page. 3. Enter your [employer] Access Code exactly as it appears below. You will not need to use this code after youve completed the sign-up process. If you do not sign up before the expiration date, you must request a new code. · [employer] Access Code: [email or web address] Expires: 10/4/2017 11:57 AM 
 
4. Enter the last four digits of your Social Security Number (xxxx) and Date of Birth (mm/dd/yyyy) as indicated and click Submit. You will be taken to the next sign-up page. 5. Create a [employer] ID. This will be your [employer] login ID and cannot be changed, so think of one that is secure and easy to remember. 6. Create a [employer] password. You can change your password at any time. 7. Enter your Password Reset Question and Answer. This can be used at a later time if you forget your password. 8. Enter your e-mail address. You will receive e-mail notification when new information is available in [address] 9. Click Sign Up. You can now view and download portions of your medical record. 10. Click the Download Summary menu link to download a portable copy of your medical information. If you have questions, please visit the Frequently Asked Questions section of the [employer] website. Remember, [employer] is NOT to be used for urgent needs. For medical emergencies, dial 911. Now available from your iPhone and Android! Please provide this summary of care documentation to your next provider. Your primary care clinician is listed as LEONILA MICHAEL. If you have any questions after today's visit, please call [phone].

## 2017-07-19 NOTE — PROGRESS NOTES
REASON FOR VISIT    This is a follow-up visit for Mr. Farzana Blanca for Seropositive Erosive Rheumatoid Arthritis. Inflammatory arthritis phenotype includes:  Anti-CCP positive: yes (>300)  Rheumatoid factor positive: yes (527)  Erosive disease: yes  Extra-articular manifestations include: rheumatoid nodules     Immunosuppression Screening (7/06/2017): Quantiferon TB: negative  PPD:  Not performed  Hepatitis B: negative  Hepatitis C: negative    Therapy History includes:  Current DMARD therapy include: none  Prior DMARD therapy include: leflunomide   Discontinued DMARDs because of inefficacy: None  Discontinued DMARDs because of side effects: None    Immunizations: There is no immunization history on file for this patient. Active problems include:    Patient Active Problem List   Diagnosis Code    Lung nodule < 6cm on CT R91.1    Seropositive rheumatoid arthritis of multiple sites ([facility] [address] M05.79    Pulmonary fibrosis (HCC) J84.10    Tobacco abuse Z72.0    Hilar lymphadenopathy R59.0    Primary osteoarthritis of both hands M19.041, M19.042       HISTORY OF PRESENT ILLNESS    Mr. Farzana Blanca returns for a follow-up. On his last visit, I discussed resuming DMARD therapy. He had been on leflunomide with good tolerance but discontinued it because he felt scared of immunosuppression. I also counseled him about smoking cessation. He continues to smoke. Today, he continues to complains of pain, swelling, and stiffness lasting more than a few hours. Last toxicity monitoring by blood work was done on 7/06/2017 and did not reveal any significant adverse effects, . Most recent inflammatory markers from 7/06/2017 revealed a ESR 21 mm/hr (previously N/A mm/hr) and CRP 5.1 mg/L (previously N/A mg/L). The patient has not had any interval hospital admissions, infections, or surgeries.     REVIEW OF SYSTEMS    A comprehensive review of systems was performed and pertinent results are documented in the HPI, review of systems is otherwise non-contributory. PAST MEDICAL HISTORY    He has a past medical history of Rheumatoid arthritis ([address] FAMILY HISTORY    His family history includes Arthritis-rheumatoid in his cousin; Cancer in his mother; Glaucoma in his mother; Kidney Disease in his father. SOCIAL HISTORY    He reports that he has been smoking. He has never used smokeless tobacco. He reports that he does not drink alcohol or use illicit drugs. MEDICATIONS    Current Outpatient Prescriptions   Medication Sig Dispense Refill    leflunomide (ARAVA) 20 mg tablet Take 1 Tab by mouth daily for 90 days. Take half tab daily for 7 days and then one tab if tolerated 90 Tab 0    naproxen (NAPROSYN) 500 mg tablet Take 1 Tab by mouth two (2) times daily (with meals). Indications: RHEUMATOID ARTHRITIS 60 Tab 5        ALLERGIES    Allergies   Allergen Reactions    Codeine Nausea and Vomiting       PHYSICAL EXAMINATION    Visit Vitals    /87 (BP 1 Location: Right arm, BP Patient Position: Sitting)    Pulse 89    Temp 95.9 °F (35.5 °C)    Resp 18    Ht 5' 6\" (1.676 m)    Wt 168 lb (76.2 kg)    SpO2 98%    BMI 27.12 kg/m2     Body mass index is 27.12 kg/(m^2). General: Patient is alert, oriented x 3, not in acute distress    HEENT:   Sclerae are not injected and appear moist.  Oral mucous membranes are moist, there are no ulcers present. There is no alopecia. Neck is supple, there is no lymphadenopathy. Cardiovascular:  Heart is regular rate and rhythm, no murmurs. Chest:  Lungs are clear to auscultation bilaterally. No rhonchi, wheezes, or crackles. Abdomen:  Soft, non-tender. Extremities:  Free of clubbing, cyanosis, edema    Neurological exam:  No focal sensory deficits, muscle strength is full in upper and lower extremities     Skin exam:  There are no rashes, no alopecia, no discoid lesions, no active Raynaud's, no livedo reticularis, no periungual erythema.     Rheumatoid Nodules: LEFT: olecranon, IP, 1st MTP and 5th MTP. RIGHT: IP, 3rd IP, 1st MTP, 1st IP and 5th proximal MT     Musculoskeletal exam:  A comprehensive musculoskeletal exam was performed for all joints of each upper and lower extremity and assessed for swelling, tenderness and range of motion. Positive results are documented as below: Today's exam is the same as last visit. Bilateral [place] and [place] nodes. Joint Count 7/6/2017   Patient pain (0-100) 75   MHAQ 0.125   Left wrist- Swollen 1   Left 1st MCP - Tender 1   Left 1st MCP - Swollen 1   Left 2nd MCP - Tender 1   Left 2nd MCP - Swollen 1   Left 3rd MCP - Swollen 1   Left 5th MCP - Swollen 1   Left 2nd PIP - Swollen 1   Left 3rd PIP - Swollen 1   Left 4th PIP - Swollen 1   Left 5th PIP - Swollen 1   Right wrist- Swollen 1   Right 1st MCP - Tender 1   Right 1st MCP - Swollen 1   Right 2nd MCP - Tender 1   Right 2nd MCP - Swollen 1   Right 3rd MCP - Tender 1   Right 3rd MCP - Swollen 1   Right 4th MCP - Tender 1   Right 4th MCP - Swollen 1   Right 5th MCP - Tender 1   Right 5th MCP - Swollen 1   Right 2nd PIP - Swollen 1   Right 3rd PIP - Swollen 1   Right 4th PIP - Tender 1   Right 4th PIP - Swollen 1   Right 5th PIP - Swollen 1   Tender Joint Count (Total) 8   Swollen Joint Count (Total) 19   Physician Assessment (0-10) 5       DATA REVIEW    Laboratory     The following laboratory results were reviewed and discussed with the patient:    Office Visit on 07/06/2017   Component Date Value    WBC 07/06/2017 9.2     RBC 07/06/2017 5.07     HGB 07/06/2017 16.4     HCT 07/06/2017 48.3     MCV 07/06/2017 95     MCH 07/06/2017 32.3     MCHC 07/06/2017 34.0     RDW 07/06/2017 13.6     PLATELET 14/87/5087 053     NEUTROPHILS 07/06/2017 70     Lymphocytes 07/06/2017 18     MONOCYTES 07/06/2017 10     EOSINOPHILS 07/06/2017 2     BASOPHILS 07/06/2017 0     ABS. NEUTROPHILS 07/06/2017 6.4     Abs Lymphocytes 07/06/2017 1.6     ABS.  MONOCYTES 07/06/2017 0.9     ABS. EOSINOPHILS 07/06/2017 0.2     ABS. BASOPHILS 07/06/2017 0.0     IMMATURE GRANULOCYTES 07/06/2017 0     ABS. IMM. GRANS. 07/06/2017 0.0     Hep B surface Ag screen 07/06/2017 Negative     Hepatitis Be Antigen 07/06/2017 Negative     Hep B Core Ab, IgM 07/06/2017 Negative     Hep B Core Ab, total 07/06/2017 Negative     Hepatitis Be Antibody 07/06/2017 Negative     HEP B SURFACE AB, QUAL 07/06/2017 Non Reactive     HCV Ab 07/06/2017 <0.1     Glucose 07/06/2017 87     BUN 07/06/2017 16     Creatinine 07/06/2017 0.98     GFR est non-AA 07/06/2017 82     GFR est AA 07/06/2017 95     BUN/Creatinine ratio 07/06/2017 16     Sodium 07/06/2017 142     Potassium 07/06/2017 4.5     Chloride 07/06/2017 104     CO2 07/06/2017 20     Calcium 07/06/2017 10.0     Protein, total 07/06/2017 7.3     Albumin 07/06/2017 4.4     GLOBULIN, TOTAL 07/06/2017 2.9     A-G Ratio 07/06/2017 1.5     Bilirubin, total 07/06/2017 <0.2     Alk. phosphatase 07/06/2017 127*    AST (SGOT) 07/06/2017 23     ALT (SGPT) 07/06/2017 21     C-Reactive Protein, Qt 07/06/2017 5.1*    Sed rate (ESR) 07/06/2017 21     QuantiFERON Incubation 07/06/2017                      Value:Incubated, specimen forwarded to [facility], [place] for  completion of the assay.       Albumin 07/06/2017 4.1     Alpha-1-globulin 07/06/2017 0.2     ALPHA-2 GLOBULIN 07/06/2017 0.7     Beta globulin 07/06/2017 1.3     Gamma globulin 07/06/2017 1.0     M-spike 07/06/2017 Not Observed     Globulin, total 07/06/2017 3.2     A/G ratio 07/06/2017 1.3     Please note 07/06/2017 Comment     Interpretation (see belo* 07/06/2017 Comment     Comment 07/06/2017 Comment     QuantiFERON TB Gold 07/06/2017 Negative     QUANTIFERON CRITERIA 07/06/2017 Comment     QuantiFERON TB Ag Value 07/06/2017 0.02     QuantiFERON Nil Value 07/06/2017 0.03     QuantiFERON Mitogen Value 07/06/2017 9.87     QFT TB Ag minus Nil Value 07/06/2017 <0.00     Interpretation: 07/06/2017 Comment        Imaging    Musculoskeletal Ultrasound    None    Radiographs    Bilateral Hand 7/06/2017: RIGHT: Bones are osteopenic. No acute fracture or dislocation. Soft tissues are prominent adjacent to the first IP joint and third DIP joint. No soft tissue calcification or periosteal reaction. Subtle erosion of the first metacarpal head at the first MCP joint. Subtle erosion of the second proximal phalanx at the second MCP joint. No other erosion. Age-appropriate radiocarpal and intercarpal osteoarthritis. Mild narrowing of the first and second MCP joints. No narrowing or erosion of the IP joints. LEFT: Bones are osteopenic. No acute fracture or dislocation. Radiocarpal mild osteoarthritis is age-appropriate. No carpal erosion. CMC joints are age-appropriate. Minimal first MCP and second MCP joint space narrowing and small marginal osteophytes. No MCP joint erosion. First IP joint is within normal limits. Soft tissue prominence adjacent to the first IP joint is nonspecific. Remaining IP joints are within normal limits.     Bilateral Foot 7/06/2017: Bones are osteopenic. No acute fracture or dislocation. Soft tissue prominence is adjacent to the bilateral fifth MTP joints. There are erosions on both sides of the left first IP joint. Subchondral cysts are favored over erosions in the right fourth proximal phalanx at the fourth MTP joint. No periosteal reaction. Bone island is in the left second distal phalanx.     CT Imaging    CT Low Dose Cancer 5/06/2016: without contrast showed mild nonspecific left apical opacities may represent scarring. There are three 4 mm lung nodules (series 4, images 197, 79, 65). Fibrotic changes seen in the lungs. Nonspecific left upper lobe longitudinal subpleural opacity may represent postsurgical change or scarring. No other significant lung abnormality. The central airways are patent. Enlarged right hilar lymph node measuring 16 mm (series 3, image 113). There are several other prominent but nonenlarged mediastinal lymph nodes. No pleural or pericardial effusion. Slightly nodular contour of the liver. Mild nonspecific thickening of the left adrenal gland. Small hiatal hernia. Small right renal calculi. No destructive osseous lesion. MR Imaging    None    DXA     None    ASSESSMENT AND PLAN    This is a follow-up visit for Mr. Bueno.     1) Seropositive Erosive Nodular Rheumatoid Arthritis. I discussed initiation with the DMARD Arava (leflunomide). I informed the patient the potential adverse effects, which may include: nausea, vomiting, dyspepsia, diarrhea, oral ulcers, infection, liver function abnormalities, blood count abnormalities, and rarely melanoma and non-melanoma skin cancer. The patient understood these possible adverse effects. I informed the patient of the need for routine CBC and CMP as a measure for long term use of immunosuppressants. I also instructed the patient to avoid ill contacts and the needs for annual influenza vaccines and the pneumonia vaccines. In childbearing patients, pregnancy is contra-indicated on leflunomide due to risk for birth defects. I informed the patient that leflunomide may take 4 to 12 weeks to be effective. I prescribed the patient Arava (leflunomide) 20 mg, and instructed to start with half a tablet (10 mg) daily for 7 days and then increase to a full tablet (20 mg) if he has no side effects, such as diarrhea or upset stomach. The patient will follow up in 4 weeks for laboratory monitoring.    2) Pulmonary Fibrosis. This was seen incidentally on a CT chest cancer screening. The description was non-specific and just \"fibrotic changes seen in lungs\". No mention of location or pattern. The question is whether this is from smoking, inhaled chemicals from work, or due to Rheumatoid Arthritis. I would like a high resolution CT chest but he declined.    3) Tobacco Abuse. He is a smoker of 40 years.  I counseled smoking cessation due to #1.      4) Hilar Lymphoadenopathy. Chest CT showed enlarged right hilar lymph node measuring 16 mm. There are several other prominent but nonenlarged mediastinal lymph nodes. He declined high resolution CT chest.     5) Bilateral Hand Osteoarthritis. This was not an active issue today. The patient voiced understanding of the aforementioned assessment and plan. Summary of plan was provided in the After Visit Summary patient instructions.      TODAY'S ORDERS    Orders Placed This Encounter    leflunomide (ARAVA) 20 mg tablet     Future Appointments  Date Time Provider Kelton Georgie   8/16/2017 8:20 AM MD Zoran Fontaine MD, [address] [facility]    Adult Rheumatology   Musculoskeletal Ultrasound Certified  [address] [place] [address]   [id number] [place] [address]   Phone [phone]  Fax [phone]

## 2017-08-16 ENCOUNTER — OFFICE VISIT (OUTPATIENT)
Dept: RHEUMATOLOGY | Age: 62
End: 2017-08-16

## 2017-08-16 VITALS
HEIGHT: 66 IN | SYSTOLIC BLOOD PRESSURE: 147 MMHG | RESPIRATION RATE: 18 BRPM | TEMPERATURE: 97.7 F | BODY MASS INDEX: 26.97 KG/M2 | HEART RATE: 86 BPM | DIASTOLIC BLOOD PRESSURE: 81 MMHG | OXYGEN SATURATION: 95 % | WEIGHT: 167.8 LBS

## 2017-08-16 DIAGNOSIS — Z79.60 LONG-TERM USE OF IMMUNOSUPPRESSANT MEDICATION: ICD-10-CM

## 2017-08-16 DIAGNOSIS — M71.162 SEPTIC PREPATELLAR BURSITIS, LEFT: ICD-10-CM

## 2017-08-16 DIAGNOSIS — Z72.0 TOBACCO ABUSE: ICD-10-CM

## 2017-08-16 DIAGNOSIS — R59.0 HILAR LYMPHADENOPATHY: ICD-10-CM

## 2017-08-16 DIAGNOSIS — J84.10 PULMONARY FIBROSIS (HCC): ICD-10-CM

## 2017-08-16 DIAGNOSIS — M19.042 PRIMARY OSTEOARTHRITIS OF BOTH HANDS: ICD-10-CM

## 2017-08-16 DIAGNOSIS — M05.79 SEROPOSITIVE RHEUMATOID ARTHRITIS OF MULTIPLE SITES (HCC): Primary | ICD-10-CM

## 2017-08-16 DIAGNOSIS — M19.041 PRIMARY OSTEOARTHRITIS OF BOTH HANDS: ICD-10-CM

## 2017-08-16 PROBLEM — M71.169 SEPTIC PREPATELLAR BURSITIS: Status: ACTIVE | Noted: 2017-08-16

## 2017-08-16 NOTE — PROGRESS NOTES
REASON FOR VISIT    This is a follow-up visit for Mr. Farzana Blanca for Seropositive Erosive Rheumatoid Arthritis. Inflammatory arthritis phenotype includes:  Anti-CCP positive: yes (>300)  Rheumatoid factor positive: yes (527)  Erosive disease: yes  Extra-articular manifestations include: rheumatoid nodules     Immunosuppression Screening (7/06/2017): Quantiferon TB: negative  PPD:  Not performed  Hepatitis B: negative  Hepatitis C: negative    Therapy History includes:  Current DMARD therapy include: leflunomide 20 mg daily   Prior DMARD therapy include: leflunomide   Discontinued DMARDs because of inefficacy: None  Discontinued DMARDs because of side effects: None    Immunizations: There is no immunization history on file for this patient. Active problems include:    Patient Active Problem List   Diagnosis Code    Lung nodule < 6cm on CT R91.1    Seropositive rheumatoid arthritis of multiple sites ([facility] [address] M05.79    Pulmonary fibrosis (HCC) J84.10    Tobacco abuse Z72.0    Hilar lymphadenopathy R59.0    Primary osteoarthritis of both hands M19.041, M19.042    Long-term use of immunosuppressant medication Z79.899    Septic prepatellar bursitis M71.169       HISTORY OF PRESENT ILLNESS    Mr. Farzana Blanca returns for a follow-up. On his last visit, I resumed leflunomide 20 mg which he has taken with good tolerance    Today, he reports swelling in his hand and stiffness lasting an hour but no pain. He developed a left knee collection that was non-tender. He works as a , which involved crawling. He continues to smoke 3/4 of pack daily. He endorses a cough. Last toxicity monitoring by blood work was done on 7/06/2017 and did not reveal any significant adverse effects, . Most recent inflammatory markers from 7/06/2017 revealed a ESR 21 mm/hr (previously N/A mm/hr) and CRP 5.1 mg/L (previously N/A mg/L).     The patient has not had any interval hospital admissions, infections, or surgeries. REVIEW OF SYSTEMS    A comprehensive review of systems was performed and pertinent results are documented in the HPI, review of systems is otherwise non-contributory. PAST MEDICAL HISTORY    He has a past medical history of Rheumatoid arthritis ([address] FAMILY HISTORY    His family history includes Arthritis-rheumatoid in his cousin; Cancer in his mother; Glaucoma in his mother; Kidney Disease in his father. SOCIAL HISTORY    He reports that he has been smoking. He has a 30.00 pack-year smoking history. He has never used smokeless tobacco. He reports that he does not drink alcohol or use illicit drugs. MEDICATIONS    Current Outpatient Prescriptions   Medication Sig Dispense Refill    leflunomide (ARAVA) 20 mg tablet Take 1 Tab by mouth daily for 90 days. Take half tab daily for 7 days and then one tab if tolerated 90 Tab 0    naproxen (NAPROSYN) 500 mg tablet Take 1 Tab by mouth two (2) times daily (with meals). Indications: RHEUMATOID ARTHRITIS 60 Tab 5        ALLERGIES    Allergies   Allergen Reactions    Codeine Nausea and Vomiting       PHYSICAL EXAMINATION    Visit Vitals    /81 (BP 1 Location: Left arm, BP Patient Position: Sitting)    Pulse 86    Temp 97.7 °F (36.5 °C) (Oral)    Resp 18    Ht 5' 6\" (1.676 m)    Wt 167 lb 12.8 oz (76.1 kg)    SpO2 95%    BMI 27.08 kg/m2     Body mass index is 27.08 kg/(m^2). General: Patient is alert, oriented x 3, not in acute distress    HEENT:   Sclerae are not injected and appear moist.  Oral mucous membranes are moist, there are no ulcers present. There is no alopecia. Neck is supple     Cardiovascular:  Heart is regular rate and rhythm, no murmurs. Chest:  Lungs are clear to auscultation bilaterally. No rhonchi, wheezes, or crackles.     Extremities:  Free of clubbing, cyanosis, edema    Neurological exam:  No focal sensory deficits, muscle strength is full in upper and lower extremities     Skin exam:  There are no rashes, no alopecia, no discoid lesions, no active Raynaud's, no livedo reticularis, no periungual erythema. Rheumatoid Nodules: LEFT: olecranon, IP, 1st MTP and 5th MTP. RIGHT: IP, 3rd IP, 1st MTP, 1st IP and 5th proximal MT   Purulent discharge from left patellar bursa    Musculoskeletal exam:  A comprehensive musculoskeletal exam was performed for all joints of each upper and lower extremity and assessed for swelling, tenderness and range of motion. Positive results are documented as below:     Bilateral [place] and [place] nodes.     Joint Count 8/16/2017 7/6/2017   Patient pain (0-100) 5 75   MHAQ 0 0.125   Left elbow - Swollen 1 -   Left wrist- Swollen 1 1   Left 1st MCP - Tender - 1   Left 1st MCP - Swollen 1 1   Left 2nd MCP - Tender - 1   Left 2nd MCP - Swollen 1 1   Left 3rd MCP - Swollen - 1   Left 4th MCP - Swollen 1 -   Left 5th MCP - Swollen 1 1   Left 2nd PIP - Swollen 1 1   Left 3rd PIP - Tender 1 -   Left 3rd PIP - Swollen - 1   Left 4th PIP - Swollen - 1   Left 5th PIP - Swollen - 1   Right elbow - Swollen 1 -   Right wrist- Swollen 1 1   Right 1st MCP - Tender - 1   Right 1st MCP - Swollen 1 1   Right 2nd MCP - Tender - 1   Right 2nd MCP - Swollen 1 1   Right 3rd MCP - Tender - 1   Right 3rd MCP - Swollen 1 1   Right 4th MCP - Tender - 1   Right 4th MCP - Swollen 1 1   Right 5th MCP - Tender - 1   Right 5th MCP - Swollen 1 1   Right 2nd PIP - Swollen 1 1   Right 3rd PIP - Swollen 1 1   Right 4th PIP - Tender 1 1   Right 4th PIP - Swollen 1 1   Right 5th PIP - Swollen 1 1   Tender Joint Count (Total) 2 8   Swollen Joint Count (Total) 18 19   Physician Assessment (0-10) 3 5   Patient Assessment (0-10) 1.5 -   CDAI Total (calculated) 24.5 -       DATA REVIEW    Laboratory     The following laboratory results were reviewed and discussed with the patient:    Office Visit on 07/06/2017   Component Date Value    WBC 07/06/2017 9.2     RBC 07/06/2017 5.07     HGB 07/06/2017 16.4     HCT 07/06/2017 48.3     MCV 07/06/2017 95     MCH 07/06/2017 32.3     MCHC 07/06/2017 34.0     RDW 07/06/2017 13.6     PLATELET 97/40/1277 445     NEUTROPHILS 07/06/2017 70     Lymphocytes 07/06/2017 18     MONOCYTES 07/06/2017 10     EOSINOPHILS 07/06/2017 2     BASOPHILS 07/06/2017 0     ABS. NEUTROPHILS 07/06/2017 6.4     Abs Lymphocytes 07/06/2017 1.6     ABS. MONOCYTES 07/06/2017 0.9     ABS. EOSINOPHILS 07/06/2017 0.2     ABS. BASOPHILS 07/06/2017 0.0     IMMATURE GRANULOCYTES 07/06/2017 0     ABS. IMM. GRANS. 07/06/2017 0.0     Hep B surface Ag screen 07/06/2017 Negative     Hepatitis Be Antigen 07/06/2017 Negative     Hep B Core Ab, IgM 07/06/2017 Negative     Hep B Core Ab, total 07/06/2017 Negative     Hepatitis Be Antibody 07/06/2017 Negative     HEP B SURFACE AB, QUAL 07/06/2017 Non Reactive     HCV Ab 07/06/2017 <0.1     Glucose 07/06/2017 87     BUN 07/06/2017 16     Creatinine 07/06/2017 0.98     GFR est non-AA 07/06/2017 82     GFR est AA 07/06/2017 95     BUN/Creatinine ratio 07/06/2017 16     Sodium 07/06/2017 142     Potassium 07/06/2017 4.5     Chloride 07/06/2017 104     CO2 07/06/2017 20     Calcium 07/06/2017 10.0     Protein, total 07/06/2017 7.3     Albumin 07/06/2017 4.4     GLOBULIN, TOTAL 07/06/2017 2.9     A-G Ratio 07/06/2017 1.5     Bilirubin, total 07/06/2017 <0.2     Alk. phosphatase 07/06/2017 127*    AST (SGOT) 07/06/2017 23     ALT (SGPT) 07/06/2017 21     C-Reactive Protein, Qt 07/06/2017 5.1*    Sed rate (ESR) 07/06/2017 21     QuantiFERON Incubation 07/06/2017                      Value:Incubated, specimen forwarded to "[employer] [place] for  completion of the assay.       Albumin 07/06/2017 4.1     Alpha-1-globulin 07/06/2017 0.2     ALPHA-2 GLOBULIN 07/06/2017 0.7     Beta globulin 07/06/2017 1.3     Gamma globulin 07/06/2017 1.0     M-spike 07/06/2017 Not Observed     Globulin, total 07/06/2017 3.2     A/G ratio 07/06/2017 1.3     Please note 07/06/2017 Comment     Interpretation (see belo* 07/06/2017 Comment     Comment 07/06/2017 Comment     QuantiFERON TB Gold 07/06/2017 Negative     QUANTIFERON CRITERIA 07/06/2017 Comment     QuantiFERON TB Ag Value 07/06/2017 0.02     QuantiFERON Nil Value 07/06/2017 0.03     QuantiFERON Mitogen Value 07/06/2017 9.87     QFT TB Ag minus Nil Value 07/06/2017 <0.00     Interpretation: 07/06/2017 Comment        Imaging    Musculoskeletal Ultrasound    None    Radiographs    Bilateral Hand 7/06/2017: RIGHT: Bones are osteopenic. No acute fracture or dislocation. Soft tissues are prominent adjacent to the first IP joint and third DIP joint. No soft tissue calcification or periosteal reaction. Subtle erosion of the first metacarpal head at the first MCP joint. Subtle erosion of the second proximal phalanx at the second MCP joint. No other erosion. Age-appropriate radiocarpal and intercarpal osteoarthritis. Mild narrowing of the first and second MCP joints. No narrowing or erosion of the IP joints. LEFT: Bones are osteopenic. No acute fracture or dislocation. Radiocarpal mild osteoarthritis is age-appropriate. No carpal erosion. CMC joints are age-appropriate. Minimal first MCP and second MCP joint space narrowing and small marginal osteophytes. No MCP joint erosion. First IP joint is within normal limits. Soft tissue prominence adjacent to the first IP joint is nonspecific. Remaining IP joints are within normal limits.     Bilateral Foot 7/06/2017: Bones are osteopenic. No acute fracture or dislocation. Soft tissue prominence is adjacent to the bilateral fifth MTP joints. There are erosions on both sides of the left first IP joint. Subchondral cysts are favored over erosions in the right fourth proximal phalanx at the fourth MTP joint. No periosteal reaction.  Bone island is in the left second distal phalanx.     CT Imaging    CT Low Dose Cancer 5/06/2016: without contrast showed mild nonspecific left apical opacities may represent scarring. There are three 4 mm lung nodules (series 4, images 197, 79, 65). Fibrotic changes seen in the lungs. Nonspecific left upper lobe longitudinal subpleural opacity may represent postsurgical change or scarring. No other significant lung abnormality. The central airways are patent. Enlarged right hilar lymph node measuring 16 mm (series 3, image 113). There are several other prominent but nonenlarged mediastinal lymph nodes. No pleural or pericardial effusion. Slightly nodular contour of the liver. Mild nonspecific thickening of the left adrenal gland. Small hiatal hernia. Small right renal calculi. No destructive osseous lesion. MR Imaging    None    DXA     None    ASSESSMENT AND PLAN    This is a follow-up visit for Mr. Bueno.     1) Seropositive Erosive Nodular Rheumatoid Arthritis. He is tolerating lelfunomide 20 mg well. His CDAI was 24.5 with 2 tender and 18 swollen joints. He has improved from his lat visit but continues to have active nodular synovitis. He has a left patellar septic bursitis, so I asked him to hold leflunomide until it is treated. See #1. I will check labs today.    2) Long Term Use of Immunosuppressants. The patient remains on immunomodulatory medications (leflunomide) and requires frequent toxicity monitoring by blood work. Respective labs were ordered (CBC and CMP).    3) Tobacco Use. He is a smoker of 40 years. I counseled smoking cessation due to #1.      4) Hilar Lymphoadenopathy. Chest CT showed enlarged right hilar lymph node measuring 16 mm. There are several other prominent but nonenlarged mediastinal lymph nodes. He declined high resolution CT chest.     5) Pulmonary Fibrosis. This was seen incidentally on a CT chest cancer screening. The description was non-specific and just \"fibrotic changes seen in lungs\". No mention of location or pattern.  The question is whether this is from smoking, inhaled chemicals from work, or due to Rheumatoid Arthritis. I would like a high resolution CT chest but he had declined. 6) Bilateral Hand Osteoarthritis. This was not an active issue today. 7) Left Patellar Septic Bursitis. There was purulent discharge. I asked him to go to urgent care for incision and drainage and antibiotics. He will try to see his PCP and if not by Friday, he will go to urgent care. I asked him to hold leflunomide until after completion of antibiotics. The patient voiced understanding of the aforementioned assessment and plan. Summary of plan was provided in the After Visit Summary patient instructions.      TODAY'S ORDERS    Orders Placed This Encounter    CBC WITH AUTOMATED DIFF    METABOLIC PANEL, COMPREHENSIVE    C REACTIVE PROTEIN, QT    SED RATE (ESR)     Future Appointments  Date Time Provider [facility] [place]   8/17/2017 11:00 AM Prath Melgoza MD [facility] 10.   10/17/2017 9:40 AM MD Daniel Kwong MD, [address] [facility]    Adult Rheumatology   Musculoskeletal Ultrasound Certified  [address] [place] [address] [address], [place], [address] [place] [address]   Phone [phone]  Fax [phone]

## 2017-08-16 NOTE — PROGRESS NOTES
Chief Complaint   Patient presents with    Arthritis       1. Have you been to the ER, urgent care clinic since your last visit? Hospitalized since your last visit? No    2. Have you seen or consulted any other health care providers outside of the [address] [place] [address] since your last visit? Include any pap smears or colon screening.  No

## 2017-08-16 NOTE — MR AVS SNAPSHOT
Visit Information Date & Time Provider Department Dept. Phone Encounter #  
 8/16/2017  8:20 AM Milly Farrar, [address] [facility] [address] [place] [id number] Follow-up Instructions Return in about 2 months (around 10/16/2017). Upcoming Health Maintenance Date Due Pneumococcal 19-64 Medium Risk (1 of 1 - PPSV23) 4/3/1974 DTaP/Tdap/Td series (1 - Tdap) 4/3/1976 FOBT Q 1 YEAR AGE 50-75 4/3/2005 ZOSTER VACCINE AGE 60> 2/3/2015 INFLUENZA AGE 9 TO ADULT 8/1/2017 Allergies as of 8/16/2017  Review Complete On: 8/16/2017 By: Milly Farrar MD  
  
 Severity Noted Reaction Type Reactions Codeine  02/14/2017    Nausea and Vomiting Current Immunizations  Never Reviewed No immunizations on file. Not reviewed this visit You Were Diagnosed With   
  
 Codes Comments Seropositive rheumatoid arthritis of multiple sites [facility])    -  Primary ICD-10-CM: M05.79 ICD-9-CM: 714.0 Long-term use of immunosuppressant medication     ICD-10-CM: Z79.899 ICD-9-CM: V58.69 Primary osteoarthritis of both hands     ICD-10-CM: M19.041, F76.513 ICD-9-CM: 715.14 Tobacco abuse     ICD-10-CM: Z72.0 ICD-9-CM: 305.1 Pulmonary fibrosis ([facility] [address]     ICD-10-CM: J84.10 ICD-9-CM: 343 Hilar lymphadenopathy     ICD-10-CM: R59.0 ICD-9-CM: 785.6 Lung nodule < 6cm on CT     ICD-10-CM: R91.1 ICD-9-CM: 793.11 Septic prepatellar bursitis, left     ICD-10-CM: M71.162, B96.89 
ICD-9-CM: 726.65, 041.9 Vitals BP Pulse Temp Resp Height(growth percentile) Weight(growth percentile) 147/81 (BP 1 Location: Left arm, BP Patient Position: Sitting) 86 97.7 °F (36.5 °C) (Oral) 18 5' 6\" (1.676 m) 167 lb 12.8 oz (76.1 kg) SpO2 BMI Smoking Status 95% 27.08 kg/m2 Current Every Day Smoker Vitals History BMI and BSA Data Body Mass Index Body Surface Area 27.08 kg/m 2 1.88 m 2 Preferred Pharmacy Pharmacy Name Phone [id number] [address] [facility] [phone] Your Updated Medication List  
  
   
This list is accurate as of: 8/16/17  8:50 AM.  Always use your most recent med list.  
  
  
  
  
 leflunomide 20 mg tablet Commonly known as:  Raynelle Beggs Take 1 Tab by mouth daily for 90 days. Take half tab daily for 7 days and then one tab if tolerated  
  
 naproxen 500 mg tablet Commonly known as:  NAPROSYN Take 1 Tab by mouth two (2) times daily (with meals). Indications: RHEUMATOID ARTHRITIS We Performed the Following C REACTIVE PROTEIN, QT [81892 CPT(R)] CBC WITH AUTOMATED DIFF [09299 CPT(R)] METABOLIC PANEL, COMPREHENSIVE [06305 CPT(R)] SED RATE (ESR) [id number] CPT(R)] Follow-up Instructions Return in about 2 months (around 10/16/2017). Patient Instructions HOLD Leflunomide until after your knee infection is drained and you have completed antibiotics Introducing [facility]! Debbie Leonard introduces [employer] patient portal. Now you can access parts of your medical record, email your doctor's office, and request medication refills online. 1. In your internet browser, go to [email or web address] 2. Click on the First Time User? Click Here link in the Sign In box. You will see the New Member Sign Up page. 3. Enter your [employer] Access Code exactly as it appears below. You will not need to use this code after youve completed the sign-up process. If you do not sign up before the expiration date, you must request a new code. · [employer] Access Code: [email or web address] Expires: 10/4/2017 11:57 AM 
 
4. Enter the last four digits of your Social Security Number (xxxx) and Date of Birth (mm/dd/yyyy) as indicated and click Submit. You will be taken to the next sign-up page. 5. Create a [employer] ID.  This will be your [employer] login ID and cannot be changed, so think of one that is secure and easy to remember. 6. Create a [employer] password. You can change your password at any time. 7. Enter your Password Reset Question and Answer. This can be used at a later time if you forget your password. 8. Enter your e-mail address. You will receive e-mail notification when new information is available in [address] 9. Click Sign Up. You can now view and download portions of your medical record. 10. Click the Download Summary menu link to download a portable copy of your medical information. If you have questions, please visit the Frequently Asked Questions section of the [employer] website. Remember, [employer] is NOT to be used for urgent needs. For medical emergencies, dial 911. Now available from your iPhone and Android! Please provide this summary of care documentation to your next provider. Your primary care clinician is listed as LEONILA MICHAEL. If you have any questions after today's visit, please call [phone].

## 2017-08-17 ENCOUNTER — OFFICE VISIT (OUTPATIENT)
Dept: FAMILY MEDICINE CLINIC | Age: 62
End: 2017-08-17

## 2017-08-17 VITALS
DIASTOLIC BLOOD PRESSURE: 90 MMHG | TEMPERATURE: 98.3 F | WEIGHT: 167 LBS | HEART RATE: 84 BPM | HEIGHT: 66 IN | BODY MASS INDEX: 26.84 KG/M2 | RESPIRATION RATE: 20 BRPM | SYSTOLIC BLOOD PRESSURE: 151 MMHG | OXYGEN SATURATION: 96 %

## 2017-08-17 DIAGNOSIS — R21 RASH: Primary | ICD-10-CM

## 2017-08-17 LAB
ALBUMIN SERPL-MCNC: 4.1 G/DL (ref 3.6–4.8)
ALBUMIN/GLOB SERPL: 1.5 {RATIO} (ref 1.2–2.2)
ALP SERPL-CCNC: 146 IU/L (ref 39–117)
ALT SERPL-CCNC: 22 IU/L (ref 0–44)
AST SERPL-CCNC: 22 IU/L (ref 0–40)
BASOPHILS # BLD AUTO: 0.1 X10E3/UL (ref 0–0.2)
BASOPHILS NFR BLD AUTO: 1 %
BILIRUB SERPL-MCNC: 0.2 MG/DL (ref 0–1.2)
BUN SERPL-MCNC: 18 MG/DL (ref 8–27)
BUN/CREAT SERPL: 18 (ref 10–24)
CALCIUM SERPL-MCNC: 9.1 MG/DL (ref 8.6–10.2)
CHLORIDE SERPL-SCNC: 103 MMOL/L (ref 96–106)
CO2 SERPL-SCNC: 18 MMOL/L (ref 18–29)
CREAT SERPL-MCNC: 0.99 MG/DL (ref 0.76–1.27)
CRP SERPL-MCNC: 8.9 MG/L (ref 0–4.9)
EOSINOPHIL # BLD AUTO: 0.2 X10E3/UL (ref 0–0.4)
EOSINOPHIL NFR BLD AUTO: 3 %
ERYTHROCYTE [DISTWIDTH] IN BLOOD BY AUTOMATED COUNT: 13.5 % (ref 12.3–15.4)
ERYTHROCYTE [SEDIMENTATION RATE] IN BLOOD BY WESTERGREN METHOD: 23 MM/HR (ref 0–30)
GLOBULIN SER CALC-MCNC: 2.8 G/DL (ref 1.5–4.5)
GLUCOSE SERPL-MCNC: 161 MG/DL (ref 65–99)
HCT VFR BLD AUTO: 46 % (ref 37.5–51)
HGB BLD-MCNC: 15.4 G/DL (ref 12.6–17.7)
IMM GRANULOCYTES # BLD: 0 X10E3/UL (ref 0–0.1)
IMM GRANULOCYTES NFR BLD: 0 %
LYMPHOCYTES # BLD AUTO: 1.3 X10E3/UL (ref 0.7–3.1)
LYMPHOCYTES NFR BLD AUTO: 21 %
MCH RBC QN AUTO: 31.4 PG (ref 26.6–33)
MCHC RBC AUTO-ENTMCNC: 33.5 G/DL (ref 31.5–35.7)
MCV RBC AUTO: 94 FL (ref 79–97)
MONOCYTES # BLD AUTO: 0.5 X10E3/UL (ref 0.1–0.9)
MONOCYTES NFR BLD AUTO: 7 %
NEUTROPHILS # BLD AUTO: 4.2 X10E3/UL (ref 1.4–7)
NEUTROPHILS NFR BLD AUTO: 68 %
PLATELET # BLD AUTO: 369 X10E3/UL (ref 150–379)
POTASSIUM SERPL-SCNC: 4.1 MMOL/L (ref 3.5–5.2)
PROT SERPL-MCNC: 6.9 G/DL (ref 6–8.5)
RBC # BLD AUTO: 4.9 X10E6/UL (ref 4.14–5.8)
SODIUM SERPL-SCNC: 141 MMOL/L (ref 134–144)
WBC # BLD AUTO: 6.1 X10E3/UL (ref 3.4–10.8)

## 2017-08-17 RX ORDER — CEPHALEXIN 500 MG/1
1000 CAPSULE ORAL 2 TIMES DAILY
Qty: 40 CAP | Refills: 0 | Status: SHIPPED | OUTPATIENT
Start: 2017-08-17 | End: 2017-08-27

## 2017-08-17 NOTE — PROGRESS NOTES
Patient here for follow up after rheumatologist  Who suggested I&D of left knee and antibiotics. 1. Have you been to the ER, urgent care clinic since your last visit? Hospitalized since your last visit? No    2. Have you seen or consulted any other health care providers outside of the [address] [place] [address] since your last visit? Include any pap smears or colon screening. No       Chief Complaint   Patient presents with    Knee Swelling     left knee swelling , per reumatologist, i&d , antibiotics. he is a 58y.o. year old male who presents for evalution. Reviewed PmHx, RxHx, FmHx, SocHx, AllgHx and updated and dated in the chart. Patient Active Problem List    Diagnosis    Long-term use of immunosuppressant medication    Septic prepatellar bursitis    Seropositive rheumatoid arthritis of multiple sites ([facility] [address]    Pulmonary fibrosis ([facility] [address]    Tobacco abuse    Hilar lymphadenopathy    Primary osteoarthritis of both hands    Lung nodule < 6cm on CT       Review of Systems - negative except as listed above in the HPI    Objective:     Vitals:    08/17/17 1138   BP: 151/90   Pulse: 84   Resp: 20   Temp: 98.3 °F (36.8 °C)   SpO2: 96%   Weight: 167 lb (75.8 kg)   Height: 5' 6\" (1.676 m)     Physical Examination: General appearance - alert, well appearing, and in no distress  Left knee with mild ant redness and fluctuance---no dc, no streaks, nt, no joint involvement    Assessment/ Plan:   Diagnoses and all orders for this visit:    1. Rash  -     cephALEXin (KEFLEX) 500 mg capsule; Take 2 Caps by mouth two (2) times a day for 10 days. -rec warm compresses  -call with inc sxs       Follow-up Disposition:  Return if symptoms worsen or fail to improve. I have discussed the diagnosis with the patient and the intended plan as seen in the above orders. The patient understands and agrees with the plan.  The patient has received an after-visit summary and questions were answered concerning future plans.     Medication Side Effects and Warnings were discussed with patient  Patient Labs were reviewed and or requested:  Patient Past Records were reviewed and or requested    Mervat Salguero M.D. There are no Patient Instructions on file for this visit.

## 2017-08-17 NOTE — MR AVS SNAPSHOT
Visit Information Date & Time Provider Department Dept. Phone Encounter #  
 8/17/2017 11:00 AM Kathrin Pemberton MD [address] [facility] [phone] [id number] Follow-up Instructions Return if symptoms worsen or fail to improve. Your Appointments 10/17/2017  9:40 AM  
ESTABLISHED PATIENT with Makayla Maynard MD  
[address] ([address] [facility]) Appt Note: 2mo f/u  
 [address] [facility] [address]  
[phone]  
  
   
 [address] [facility] [address] [id number] Upcoming Health Maintenance Date Due Pneumococcal 19-64 Medium Risk (1 of 1 - PPSV23) 4/3/1974 DTaP/Tdap/Td series (1 - Tdap) 4/3/1976 FOBT Q 1 YEAR AGE 50-75 4/3/2005 ZOSTER VACCINE AGE 60> 2/3/2015 INFLUENZA AGE 9 TO ADULT 8/1/2017 Allergies as of 8/17/2017  Review Complete On: 8/17/2017 By: Kathrin Pemberton MD  
  
 Severity Noted Reaction Type Reactions Codeine  02/14/2017    Nausea and Vomiting Current Immunizations  Never Reviewed No immunizations on file. Not reviewed this visit You Were Diagnosed With   
  
 Codes Comments Rash    -  Primary ICD-10-CM: R21 
ICD-9-CM: 782.1 Vitals BP Pulse Temp Resp Height(growth percentile) Weight(growth percentile) 151/90 84 98.3 °F (36.8 °C) 20 5' 6\" (1.676 m) 167 lb (75.8 kg) SpO2 BMI Smoking Status 96% 26.95 kg/m2 Current Every Day Smoker Vitals History BMI and BSA Data Body Mass Index Body Surface Area  
 26.95 kg/m 2 1.88 m 2 Preferred Pharmacy Pharmacy Name Phone [id number], [address] [facility] [phone] Your Updated Medication List  
  
   
This list is accurate as of: 8/17/17 11:55 AM.  Always use your most recent med list.  
  
  
  
  
 cephALEXin 500 mg capsule Commonly known as:  Thedore Peal Take 2 Caps by mouth two (2) times a day for 10 days. leflunomide 20 mg tablet Commonly known as:  Soraya Fus Take 1 Tab by mouth daily for 90 days. Take half tab daily for 7 days and then one tab if tolerated  
  
 naproxen 500 mg tablet Commonly known as:  NAPROSYN Take 1 Tab by mouth two (2) times daily (with meals). Indications: RHEUMATOID ARTHRITIS Prescriptions Sent to Pharmacy Refills  
 cephALEXin (KEFLEX) 500 mg capsule 0 Sig: Take 2 Caps by mouth two (2) times a day for 10 days. Class: Normal  
 Pharmacy: [id number] - [place], [address] [facility] Ph #: [phone] Route: Oral  
  
Follow-up Instructions Return if symptoms worsen or fail to improve. Introducing [facility] HE[facility]! Elkin Angelo introduces [employer] patient portal. Now you can access parts of your medical record, email your doctor's office, and request medication refills online. 1. In your internet browser, go to [email or web address] 2. Click on the First Time User? Click Here link in the Sign In box. You will see the New Member Sign Up page. 3. Enter your [employer] Access Code exactly as it appears below. You will not need to use this code after youve completed the sign-up process. If you do not sign up before the expiration date, you must request a new code. · [employer] Access Code: [email or web address] Expires: 10/4/2017 11:57 AM 
 
4. Enter the last four digits of your Social Security Number (xxxx) and Date of Birth (mm/dd/yyyy) as indicated and click Submit. You will be taken to the next sign-up page. 5. Create a elmeme.met ID. This will be your [employer] login ID and cannot be changed, so think of one that is secure and easy to remember. 6. Create a [employer] password. You can change your password at any time. 7. Enter your Password Reset Question and Answer. This can be used at a later time if you forget your password. 8. Enter your e-mail address. You will receive e-mail notification when new information is available in [address] 9. Click Sign Up. You can now view and download portions of your medical record. 10. Click the Download Summary menu link to download a portable copy of your medical information. If you have questions, please visit the Frequently Asked Questions section of the [employer] website. Remember, [employer] is NOT to be used for urgent needs. For medical emergencies, dial 911. Now available from your iPhone and Android! Please provide this summary of care documentation to your next provider. Your primary care clinician is listed as LEONILA MICHAEL. If you have any questions after today's visit, please call [phone].

## 2017-08-18 NOTE — PROGRESS NOTES
The results were reviewed and a letter was sent. Inflammatory marker ALK and CRP elevated. Non-fasting sugar high. All remaining labs are normal/negative.

## 2017-11-29 ENCOUNTER — APPOINTMENT (OUTPATIENT)
Dept: GENERAL RADIOLOGY | Age: 62
End: 2017-11-29
Attending: EMERGENCY MEDICINE
Payer: COMMERCIAL

## 2017-11-29 ENCOUNTER — HOSPITAL ENCOUNTER (EMERGENCY)
Age: 62
Discharge: HOME | End: 2017-11-29
Attending: EMERGENCY MEDICINE | Admitting: EMERGENCY MEDICINE
Payer: COMMERCIAL

## 2017-11-29 VITALS
TEMPERATURE: 97.9 F | HEIGHT: 66 IN | WEIGHT: 165 LBS | RESPIRATION RATE: 18 BRPM | OXYGEN SATURATION: 98 % | HEART RATE: 100 BPM | SYSTOLIC BLOOD PRESSURE: 142 MMHG | BODY MASS INDEX: 26.52 KG/M2 | DIASTOLIC BLOOD PRESSURE: 113 MMHG

## 2017-11-29 DIAGNOSIS — S83.8X2A MENISCAL INJURY, LEFT, INITIAL ENCOUNTER: Primary | ICD-10-CM

## 2017-11-29 PROCEDURE — 99283 EMERGENCY DEPT VISIT LOW MDM: CPT

## 2017-11-29 PROCEDURE — L1830 KO IMMOB CANVAS LONG PRE OTS: HCPCS

## 2017-11-29 PROCEDURE — 73562 X-RAY EXAM OF KNEE 3: CPT

## 2017-11-29 NOTE — ED TRIAGE NOTES
Pt c/o left knee pain after being on knees yesterday painting baseboards. Denies known trauma; states has had problem with left knee for 4-5 years and was told he \"might have a torn meniscus\". Pt able to ambulate with some difficulty.

## 2017-11-29 NOTE — ED PROVIDER NOTES
HPI Comments: 58 y.o. male with past medical history significant for arthritis who presents from home via private vehicle with chief complaint of left knee pain. Pt reports that this morning he began to have sharp left knee pain with decreased ROM of his knee. He has had similar knee pain symptoms in the past intermittently and saw an orthopedist physician about 1 year ago who told him that he may have a torn meniscus. 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 He denies any specific traumatic injury, fever, chills. There are no other acute medical concerns at this time. PCP: Corinna Love MD  Note written by kyara Allred, as dictated by Lv Arias MD 9:04 AM         The history is provided by the patient. Past Medical History:   Diagnosis Date    Rheumatoid arthritis ([facility] [address]        No past surgical history on file. Family History:   Problem Relation Age of Onset    Cancer Mother      kidney    Glaucoma Mother     Kidney Disease Father     Arthritis-rheumatoid Cousin        Social History     Social History    Marital status: SINGLE     Spouse name: N/A    Number of children: N/A    Years of education: N/A     Occupational History    Not on file. Social History Main Topics    Smoking status: Current Every Day Smoker     Packs/day: 0.75     Years: 40.00    Smokeless tobacco: Never Used    Alcohol use No    Drug use: No    Sexual activity: No     Other Topics Concern    Not on file     Social History Narrative         ALLERGIES: Codeine    Review of Systems   Constitutional: Negative. Negative for appetite change, fever and unexpected weight change. HENT: Negative. Negative for ear pain, hearing loss, nosebleeds, rhinorrhea, sore throat and trouble swallowing. Respiratory: Negative. Negative for cough, chest tightness and shortness of breath. Cardiovascular: Negative. Negative for chest pain and palpitations. Gastrointestinal: Negative. Negative for abdominal distention, abdominal pain, blood in stool and vomiting. Endocrine: Negative. Genitourinary: Negative for dysuria and hematuria. Musculoskeletal: Positive for arthralgias. Negative for back pain and myalgias. Skin: Negative. Negative for rash. Allergic/Immunologic: Negative. Neurological: Negative. Negative for dizziness, syncope, weakness and numbness. Hematological: Negative. Psychiatric/Behavioral: Negative. All other systems reviewed and are negative. Vitals:    11/29/17 0837   BP: (!) 142/113   Pulse: 100   Resp: 18   Temp: 97.9 °F (36.6 °C)   SpO2: 98%   Weight: 74.8 kg (165 lb)   Height: 5' 6\" (1.676 m)            Physical Exam   Constitutional: He is oriented to person, place, and time. He appears well-developed and well-nourished. No distress. HENT:   Head: Normocephalic and atraumatic. Right Ear: External ear normal.   Left Ear: External ear normal.   Nose: Nose normal.   Mouth/Throat: Oropharynx is clear and moist.   Eyes: Conjunctivae and EOM are normal. Pupils are equal, round, and reactive to light. Neck: Normal range of motion. Neck supple. No JVD present. No thyromegaly present. Cardiovascular: Normal rate, regular rhythm, normal heart sounds and intact distal pulses. No murmur heard. Pulmonary/Chest: Effort normal and breath sounds normal. No respiratory distress. He has no wheezes. He has no rales. Abdominal: Soft. Bowel sounds are normal. He exhibits no distension. There is no tenderness. Musculoskeletal: Normal range of motion. He exhibits tenderness. He exhibits no edema. Left knee joint line tenderness with joint line narrowing. Chronic arthritic changes, no erythema or swelling. Neurovascularly intact distally. Positive McMurrays test.  No ligament laxity. Neurological: He is alert and oriented to person, place, and time. No cranial nerve deficit. Skin: Skin is warm and dry.  No rash noted.   Psychiatric: He has a normal mood and affect. His behavior is normal. Thought content normal.    Note written by kyara Ravi, as dictated by Candido Paniagua MD 9:10 AM      [facility]  ED Course       Procedures    9:18 AM  Pt's x-ray of the left knee was negative for acute injury. Will discharge home with diagnosis of likely meniscal injury. Will place patient in a knee immobilizer and have him follow up with an orthopedic physician. Pt understands and agrees with discharge and follow up plans.

## 2020-09-08 ENCOUNTER — ED HISTORICAL/CONVERTED ENCOUNTER (OUTPATIENT)
Dept: OTHER | Age: 65
End: 2020-09-08

## 2020-09-23 ENCOUNTER — VIRTUAL VISIT (OUTPATIENT)
Dept: FAMILY MEDICINE CLINIC | Age: 65
End: 2020-09-23

## 2020-09-23 DIAGNOSIS — M05.79 SEROPOSITIVE RHEUMATOID ARTHRITIS OF MULTIPLE SITES (HCC): Primary | ICD-10-CM

## 2020-09-23 DIAGNOSIS — M54.50 LUMBAR PAIN: ICD-10-CM

## 2020-09-23 DIAGNOSIS — J84.10 PULMONARY FIBROSIS (HCC): ICD-10-CM

## 2020-09-23 DIAGNOSIS — L03.90 CELLULITIS, UNSPECIFIED CELLULITIS SITE: ICD-10-CM

## 2020-09-23 PROCEDURE — 1101F PT FALLS ASSESS-DOCD LE1/YR: CPT | Performed by: FAMILY MEDICINE

## 2020-09-23 PROCEDURE — 99204 OFFICE O/P NEW MOD 45 MIN: CPT | Performed by: FAMILY MEDICINE

## 2020-09-23 PROCEDURE — 3017F COLORECTAL CA SCREEN DOC REV: CPT | Performed by: FAMILY MEDICINE

## 2020-09-23 PROCEDURE — G8427 DOCREV CUR MEDS BY ELIG CLIN: HCPCS | Performed by: FAMILY MEDICINE

## 2020-09-23 PROCEDURE — G8510 SCR DEP NEG, NO PLAN REQD: HCPCS | Performed by: FAMILY MEDICINE

## 2020-09-23 RX ORDER — DICLOFENAC SODIUM 75 MG/1
TABLET, DELAYED RELEASE ORAL
COMMUNITY
Start: 2020-08-17 | End: 2020-10-12

## 2020-09-23 RX ORDER — CYCLOBENZAPRINE HCL 10 MG
TABLET ORAL
COMMUNITY
Start: 2020-09-15 | End: 2020-10-12

## 2020-09-23 RX ORDER — PREDNISONE 10 MG/1
TABLET ORAL
COMMUNITY
Start: 2020-09-04 | End: 2021-05-03

## 2020-09-23 RX ORDER — HYDROCODONE BITARTRATE AND ACETAMINOPHEN 5; 325 MG/1; MG/1
TABLET ORAL
COMMUNITY
Start: 2020-09-08 | End: 2020-12-16

## 2020-09-23 NOTE — PROGRESS NOTES
Patient is here today to reestablish as a patient. He is at this virtual visit with both of his daughters. Patient suffers from significant rheumatoid arthritis and is seeing a new specialist this past month. He developed an infection on his right index finger and is heading to the emergency department today to have it lanced the be placed on antibiotics. In addition he complains of low back pain over the last month which is received muscle relaxants, NSAIDs, and pain medications without much benefit. Medications were reconciled and added to the from the chart. Records reviewed the back of chart. Consent: Bret Tomas, who was seen by synchronous (real-time) audio-video technology, and/or his healthcare decision maker, is aware that this patient-initiated, Telehealth encounter on 9/23/2020 is a billable service, with coverage as determined by his insurance carrier. He is aware that he may receive a bill and has provided verbal consent to proceed: YES-Consent obtained within past 12 months        712  Subjective:   Bret Tomas is a 72 y.o. male who was seen for No chief complaint on file. Prior to Admission medications    Medication Sig Start Date End Date Taking? Authorizing Provider   cyclobenzaprine (FLEXERIL) 10 mg tablet TAKE 1 TABLET ONCE A DAY (AT BEDTIME) AS NEEDED FOR MUSCLE SPASM FOR 30 DAYS 9/15/20   Provider, Historical   HYDROcodone-acetaminophen (NORCO) 5-325 mg per tablet TAKE 1 TABLET BY MOUTH EVERY 4 HOURS AS NEEDED FOR PAIN 9/8/20   Provider, Historical   diclofenac EC (VOLTAREN) 75 mg EC tablet TAKE 1 TABLET (75 MG TOTAL) BY MOUTH 2 (TWO) TIMES A DAY AS NEEDED (WITH FOOD) 8/17/20   Provider, Historical   predniSONE (DELTASONE) 10 mg tablet TAKE 2 TABLETS BY MOUTH EVERY DAY FOR 2 WEEKS, THEN 1 TABLET DAILY FOR 30 DAYS 9/4/20   Provider, Historical   naproxen (NAPROSYN) 500 mg tablet Take 1 Tab by mouth two (2) times daily (with meals).  Indications: RHEUMATOID ARTHRITIS 2/14/17   Spencer Chapa Derek Mccoy MD     Allergies   Allergen Reactions    Codeine Nausea and Vomiting       ROS    Objective:   Vital Signs: (As obtained by patient/caregiver at home)  There were no vitals taken for this visit. [INSTRUCTIONS:  \"[x]\" Indicates a positive item  \"[]\" Indicates a negative item  -- DELETE ALL ITEMS NOT EXAMINED]    Constitutional: [x] Appears well-developed and well-nourished [x] No apparent distress      [] Abnormal -     Mental status: [x] Alert and awake  [x] Oriented to person/place/time [x] Able to follow commands    [] Abnormal -     Eyes:   EOM    [x]  Normal    [] Abnormal -   Sclera  [x]  Normal    [] Abnormal -          Discharge [x]  None visible   [] Abnormal -     HENT: [x] Normocephalic, atraumatic  [] Abnormal -   [x] Mouth/Throat: Mucous membranes are moist    External Ears [x] Normal  [] Abnormal -    Neck: [x] No visualized mass [] Abnormal -     Pulmonary/Chest: [x] Respiratory effort normal   [x] No visualized signs of difficulty breathing or respiratory distress        [] Abnormal -        Neurological:        [x] No Facial Asymmetry (Cranial nerve 7 motor function) (limited exam due to video visit)          [x] No gaze palsy        [] Abnormal -          Skin:        [x] No significant exanthematous lesions or discoloration noted on facial skin         [] Abnormal -            Psychiatric:       [x] Normal Affect [] Abnormal -        [x] No Hallucinations    Other pertinent observable physical exam findings:-              Assessment & Plan:   Diagnoses and all orders for this visit:    1. Seropositive rheumatoid arthritis of multiple sites [facility])  -Patient to continue to see Dr. Terell Beatty  -Treatment on hold at this time due to infection in finger    2. Pulmonary fibrosis (HCC)  -Stable    3. Cellulitis, unspecified cellulitis site  -Refer patient stat to ED, finger on right hand is infected with redness and swelling.   Patient will need to have this lanced packed and to see a hand surgeon at the hospital.    4. Lumbar pain  -Patient experiencing lumbar pain, he is on quite a few pain medications therefore I will not add anything further at this time. I have asked that he get a x-ray of his low back when he goes to the emergency room to rule out compression fracture since he is at increased risk for this. We will set up a time the next 2 weeks to check patient out physically. We discussed the expected course, resolution and complications of the diagnosis(es) in detail. Medication risks, benefits, costs, interactions, and alternatives were discussed as indicated. I advised him to contact the office if his condition worsens, changes or fails to improve as anticipated. He expressed understanding with the diagnosis(es) and plan. Omar Sauceda is a 72 y.o. male being evaluated by a video visit encounter for concerns as above. A caregiver was present when appropriate. Due to this being a TeleHealth encounter (During XRTLA-90 public health emergency), evaluation of the following organ systems was limited: Vitals/Constitutional/EENT/Resp/CV/GI//MS/Neuro/Skin/Heme-Lymph-Imm. Pursuant to the emergency declaration under the [facility] [address] [facility] [address] waiver authority and the [employer] and [employer] Act, this Virtual  Visit was conducted, with patient's (and/or legal guardian's) consent, to reduce the patient's risk of exposure to COVID-19 and provide necessary medical care. Services were provided through a video synchronous discussion virtually to substitute for in-person clinic visit. Patient and provider were located at their individual homes.         Matilde Antunez MD

## 2020-10-12 ENCOUNTER — OFFICE VISIT (OUTPATIENT)
Dept: FAMILY MEDICINE CLINIC | Age: 65
End: 2020-10-12
Payer: MEDICARE

## 2020-10-12 VITALS
OXYGEN SATURATION: 98 % | DIASTOLIC BLOOD PRESSURE: 77 MMHG | BODY MASS INDEX: 23.3 KG/M2 | SYSTOLIC BLOOD PRESSURE: 116 MMHG | TEMPERATURE: 98 F | HEART RATE: 107 BPM | WEIGHT: 145 LBS | HEIGHT: 66 IN | RESPIRATION RATE: 18 BRPM

## 2020-10-12 DIAGNOSIS — S32.050S COMPRESSION FRACTURE OF L5 VERTEBRA, SEQUELA: Primary | ICD-10-CM

## 2020-10-12 DIAGNOSIS — M05.79 SEROPOSITIVE RHEUMATOID ARTHRITIS OF MULTIPLE SITES (HCC): ICD-10-CM

## 2020-10-12 PROBLEM — S32.050A COMPRESSION FRACTURE OF FIFTH LUMBAR VERTEBRA (HCC): Status: ACTIVE | Noted: 2020-10-12

## 2020-10-12 PROCEDURE — 99214 OFFICE O/P EST MOD 30 MIN: CPT | Performed by: FAMILY MEDICINE

## 2020-10-12 PROCEDURE — 3017F COLORECTAL CA SCREEN DOC REV: CPT | Performed by: FAMILY MEDICINE

## 2020-10-12 PROCEDURE — 1101F PT FALLS ASSESS-DOCD LE1/YR: CPT | Performed by: FAMILY MEDICINE

## 2020-10-12 PROCEDURE — G8536 NO DOC ELDER MAL SCRN: HCPCS | Performed by: FAMILY MEDICINE

## 2020-10-12 PROCEDURE — G8420 CALC BMI NORM PARAMETERS: HCPCS | Performed by: FAMILY MEDICINE

## 2020-10-12 PROCEDURE — G8510 SCR DEP NEG, NO PLAN REQD: HCPCS | Performed by: FAMILY MEDICINE

## 2020-10-12 PROCEDURE — G8427 DOCREV CUR MEDS BY ELIG CLIN: HCPCS | Performed by: FAMILY MEDICINE

## 2020-10-12 PROCEDURE — G0463 HOSPITAL OUTPT CLINIC VISIT: HCPCS | Performed by: FAMILY MEDICINE

## 2020-10-12 RX ORDER — METHOCARBAMOL 500 MG/1
TABLET, FILM COATED ORAL
Qty: 45 TAB | Refills: 1 | Status: SHIPPED | OUTPATIENT
Start: 2020-10-12 | End: 2020-10-12 | Stop reason: SDUPTHER

## 2020-10-12 RX ORDER — IBUPROFEN 800 MG/1
800 TABLET ORAL
Qty: 100 TAB | Refills: 5 | Status: SHIPPED | OUTPATIENT
Start: 2020-10-12

## 2020-10-12 RX ORDER — TAMSULOSIN HYDROCHLORIDE 0.4 MG/1
CAPSULE ORAL
COMMUNITY
Start: 2020-10-02 | End: 2020-12-16

## 2020-10-12 RX ORDER — METHOCARBAMOL 500 MG/1
TABLET, FILM COATED ORAL
COMMUNITY
Start: 2020-10-02 | End: 2020-10-12 | Stop reason: SDUPTHER

## 2020-10-12 RX ORDER — TRAMADOL HYDROCHLORIDE 50 MG/1
TABLET ORAL
COMMUNITY
Start: 2020-09-03 | End: 2020-10-12

## 2020-10-12 RX ORDER — TRAMADOL HYDROCHLORIDE 50 MG/1
50 TABLET ORAL
Qty: 90 TAB | Refills: 0 | Status: SHIPPED | OUTPATIENT
Start: 2020-10-12 | End: 2020-10-12 | Stop reason: SDUPTHER

## 2020-10-12 RX ORDER — METHOCARBAMOL 500 MG/1
TABLET, FILM COATED ORAL
Qty: 45 TAB | Refills: 1 | Status: SHIPPED | OUTPATIENT
Start: 2020-10-12

## 2020-10-12 RX ORDER — IBUPROFEN 800 MG/1
800 TABLET ORAL
Qty: 100 TAB | Refills: 5 | Status: SHIPPED | OUTPATIENT
Start: 2020-10-12 | End: 2020-10-12 | Stop reason: SDUPTHER

## 2020-10-12 RX ORDER — TRAMADOL HYDROCHLORIDE 50 MG/1
50 TABLET ORAL
Qty: 90 TAB | Refills: 0 | Status: SHIPPED | OUTPATIENT
Start: 2020-10-12 | End: 2020-11-11

## 2020-10-12 NOTE — PROGRESS NOTES
Chief Complaint   Patient presents with    Back Pain     XR and MRI done: Septic:Teo on IV Antibiotics    Finger Swelling     Infection: on prednisone 15 mg    Arthritis     1. Have you been to the ER, urgent care clinic since your last visit? Hospitalized since your last visit? Yes Where: [facility] ED    2. Have you seen or consulted any other health care providers outside of the [address] [place] [address] since your last visit? Include any pap smears or colon screening. No    Patient here today after being there to the hospital with infection to his hands and treatment for significant compression fractures in his back. Patient is in 10 out of 10 pain takes tramadol with benefit but only takes it after he has severe pain but is in pain all day long. Patient seen multiple specialists. Chief Complaint   Patient presents with    Back Pain     XR and MRI done: Septic:Teo on IV Antibiotics    Finger Swelling     Infection: on prednisone 15 mg    Arthritis     He is a 72 y.o. male who presents for evalution. Reviewed PmHx, RxHx, FmHx, SocHx, AllgHx and updated and dated in the chart.     Patient Active Problem List    Diagnosis    Compression fracture of fifth lumbar vertebra (HCC)    Long-term use of immunosuppressant medication    Septic prepatellar bursitis    Seropositive rheumatoid arthritis of multiple sites ([facility] [address]    Pulmonary fibrosis (HCC)    Tobacco abuse    Hilar lymphadenopathy    Primary osteoarthritis of both hands    Lung nodule < 6cm on CT       Review of Systems - negative except as listed above in the HPI    Objective:     Vitals:    10/12/20 1617   BP: 116/77   Pulse: (!) 107   Resp: 18   Temp: 98 °F (36.7 °C)   TempSrc: Oral   SpO2: 98%   Weight: 145 lb (65.8 kg)   Height: 5' 6\" (1.676 m)     Physical Examination: General appearance - alert, well appearing, and in no distress  Neck - supple, no significant adenopathy  Chest - clear to auscultation, no wheezes, rales or rhonchi, symmetric air entry  Heart - normal rate, regular rhythm, normal S1, S2, no murmurs, rubs, clicks or gallops  Wearing back brace    Assessment/ Plan:   Diagnoses and all orders for this visit:    1. Compression fracture of L5 vertebra, sequela  -     traMADoL (ULTRAM) 50 mg tablet; Take 1 Tab by mouth every eight (8) hours as needed for Pain for up to 30 days. Max Daily Amount: 150 mg.  -We will place patient on standing dose of pain medications 3 times a day  - is good  -We will do pain contract at next office visit as well as UDS  -Patient has significant pain due to compression fractures and multiple other ailments. 2. Seropositive rheumatoid arthritis of multiple sites ([facility] [address]  -as above    Other orders  -     ibuprofen (MOTRIN) 800 mg tablet; Take 1 Tab by mouth every eight (8) hours as needed for Pain.  -     methocarbamoL (ROBAXIN) 500 mg tablet; TAKE 1 TABLET BY MOUTH 3 TIMES A DAY AS NEEDED FOR MUSCLE SPASMS (USE 1ST)             I have discussed the diagnosis with the patient and the intended plan as seen in the above orders. The patient understands and agrees with the plan. The patient has received an after-visit summary and questions were answered concerning future plans. Medication Side Effects and Warnings were discussed with patient  Patient Labs were reviewed and or requested:  Patient Past Records were reviewed and or requested    Lashonda Chavez M.D. There are no Patient Instructions on file for this visit.

## 2020-10-15 ENCOUNTER — DOCUMENTATION ONLY (OUTPATIENT)
Dept: FAMILY MEDICINE CLINIC | Age: 65
End: 2020-10-15

## 2020-11-19 ENCOUNTER — HOSPITAL ENCOUNTER (OUTPATIENT)
Dept: LAB | Age: 65
Discharge: HOME | End: 2020-11-19
Payer: MEDICARE

## 2020-11-19 ENCOUNTER — HOSPITAL ENCOUNTER (OUTPATIENT)
Dept: LAB | Age: 65
Discharge: HOME | End: 2020-11-19

## 2020-11-23 ENCOUNTER — HOSPITAL ENCOUNTER (OUTPATIENT)
Dept: LAB | Age: 65
Discharge: HOME | End: 2020-11-23

## 2020-11-23 LAB
BASOPHILS # BLD: 0 K/UL (ref 0–0.1)
BASOPHILS NFR BLD: 0 % (ref 0–1)
DIFFERENTIAL METHOD BLD: ABNORMAL
EOSINOPHIL # BLD: 0.3 K/UL (ref 0–0.4)
EOSINOPHIL NFR BLD: 4 % (ref 0–7)
ERYTHROCYTE [DISTWIDTH] IN BLOOD BY AUTOMATED COUNT: 17 % (ref 11.5–14.5)
HCT VFR BLD AUTO: 27 % (ref 36.6–50.3)
HGB BLD-MCNC: 8.3 G/DL (ref 12.1–17)
IMM GRANULOCYTES # BLD AUTO: 0 K/UL (ref 0–0.04)
IMM GRANULOCYTES NFR BLD AUTO: 0 % (ref 0–0.5)
LYMPHOCYTES # BLD: 1.6 K/UL (ref 0.8–3.5)
LYMPHOCYTES NFR BLD: 21 % (ref 12–49)
MCH RBC QN AUTO: 30.5 PG (ref 26–34)
MCHC RBC AUTO-ENTMCNC: 30.7 G/DL (ref 30–36.5)
MCV RBC AUTO: 99.3 FL (ref 80–99)
MONOCYTES # BLD: 0.6 K/UL (ref 0–1)
MONOCYTES NFR BLD: 8 % (ref 5–13)
NEUTS SEG # BLD: 5 K/UL (ref 1.8–8)
NEUTS SEG NFR BLD: 67 % (ref 32–75)
NRBC # BLD: 0 K/UL (ref 0–0.01)
NRBC BLD-RTO: 0 PER 100 WBC
PLATELET # BLD AUTO: 508 K/UL (ref 150–400)
PMV BLD AUTO: 9 FL (ref 8.9–12.9)
RBC # BLD AUTO: 2.72 M/UL (ref 4.1–5.7)
WBC # BLD AUTO: 7.6 K/UL (ref 4.1–11.1)

## 2020-11-23 PROCEDURE — 85025 COMPLETE CBC W/AUTO DIFF WBC: CPT

## 2020-11-23 PROCEDURE — 36415 COLL VENOUS BLD VENIPUNCTURE: CPT

## 2020-12-07 ENCOUNTER — DOCUMENTATION ONLY (OUTPATIENT)
Dept: FAMILY MEDICINE CLINIC | Age: 65
End: 2020-12-07

## 2020-12-07 NOTE — PROGRESS NOTES
[employer] home health certification & POC were put on [facility] desk to process

## 2020-12-08 ENCOUNTER — TELEPHONE (OUTPATIENT)
Dept: FAMILY MEDICINE CLINIC | Age: 65
End: 2020-12-08

## 2020-12-08 NOTE — TELEPHONE ENCOUNTER
[employer] home health certification & POC were signed & faxed to [phone],KRISTIN,[facility] placed in scan folder to be scanned to chart.

## 2020-12-14 ENCOUNTER — TELEPHONE (OUTPATIENT)
Dept: FAMILY MEDICINE CLINIC | Age: 65
End: 2020-12-14

## 2020-12-14 NOTE — TELEPHONE ENCOUNTER
Patient's daughter call. Patient has to cancel 2:30 appt today due to positive covid. He just came home from rehab after spinal surgery . Daughter has a couple concerns she was going to discuss with Dr. Lady Winston today at his appointment. [facility] offered for Wednesday, but daughter states she can not help him because he is quarantined, and he only has a flip phone. Her concerns are:  1. Rehab doctor put him on gabapentin. She states it is causing moodiness. She has been trying tylenol. Is there something that is inbetween gabapentin and tylenol Dr. Lady Winston can order? 2. He was put on flomax for difficulty urinating, Since surgery/ rehab, he has not had any issues. Could he stop flomax? 3. He is having trouble sleeping. Day=ughter suggested melatonin and 2 tylenol pm. He had a much better sleep last night. Is this harmful or does Dr. Lady Winston want to give him something else to help him sleep?    [address] [facility] can be reached at [phone]

## 2020-12-14 NOTE — TELEPHONE ENCOUNTER
Pt's daughter, [facility] called. She cancelled pt's appt for today. SHe wanted to talk to Dr. Elena Sabillon and [facility] told her a nurse will give her a call back. Please call her at [phone].

## 2020-12-16 ENCOUNTER — VIRTUAL VISIT (OUTPATIENT)
Dept: FAMILY MEDICINE CLINIC | Age: 65
End: 2020-12-16
Payer: MEDICARE

## 2020-12-16 DIAGNOSIS — U07.1 COVID-19: Primary | ICD-10-CM

## 2020-12-16 DIAGNOSIS — M54.50 LUMBAR PAIN: ICD-10-CM

## 2020-12-16 DIAGNOSIS — F51.01 PRIMARY INSOMNIA: ICD-10-CM

## 2020-12-16 DIAGNOSIS — S32.050S COMPRESSION FRACTURE OF L5 VERTEBRA, SEQUELA: ICD-10-CM

## 2020-12-16 PROCEDURE — 1101F PT FALLS ASSESS-DOCD LE1/YR: CPT | Performed by: FAMILY MEDICINE

## 2020-12-16 PROCEDURE — G8510 SCR DEP NEG, NO PLAN REQD: HCPCS | Performed by: FAMILY MEDICINE

## 2020-12-16 PROCEDURE — 99214 OFFICE O/P EST MOD 30 MIN: CPT | Performed by: FAMILY MEDICINE

## 2020-12-16 PROCEDURE — 3017F COLORECTAL CA SCREEN DOC REV: CPT | Performed by: FAMILY MEDICINE

## 2020-12-16 PROCEDURE — G8427 DOCREV CUR MEDS BY ELIG CLIN: HCPCS | Performed by: FAMILY MEDICINE

## 2020-12-16 PROCEDURE — G0463 HOSPITAL OUTPT CLINIC VISIT: HCPCS | Performed by: FAMILY MEDICINE

## 2020-12-16 RX ORDER — ALBUTEROL SULFATE 90 UG/1
2 AEROSOL, METERED RESPIRATORY (INHALATION)
Qty: 1 INHALER | Refills: 5 | Status: SHIPPED | OUTPATIENT
Start: 2020-12-16 | End: 2021-12-11

## 2020-12-16 RX ORDER — PREDNISONE 10 MG/1
TABLET ORAL
Qty: 1 PACKAGE | Refills: 0 | Status: SHIPPED | OUTPATIENT
Start: 2020-12-16 | End: 2021-05-03

## 2020-12-16 RX ORDER — TRAZODONE HYDROCHLORIDE 50 MG/1
50 TABLET ORAL
Qty: 30 TAB | Refills: 2 | Status: SHIPPED | OUTPATIENT
Start: 2020-12-16 | End: 2021-03-23 | Stop reason: SDUPTHER

## 2020-12-16 NOTE — PROGRESS NOTES
Patient here today with complaints of being diagnosed with COVID-19 over the last week. He has no difficulty breathing this was a mild shortness of breath and wheezing and overall fatigue as well as diarrhea. He is only taken Tylenol for fevers. In addition he wants something for insomnia he is placed on gabapentin at the hospital felt like this is not helping him sleep and is not affecting his pain at and wants to discontinue. In addition he wants to discontinue the Flomax. Consent: Lillian Orellana, who was seen by synchronous (real-time) audio-video technology, and/or his healthcare decision maker, is aware that this patient-initiated, Telehealth encounter on 12/16/2020 is a billable service, with coverage as determined by his insurance carrier. He is aware that he may receive a bill and has provided verbal consent to proceed: YES-Consent obtained within past 12 months        712  Subjective:   Lillian Orellana is a 72 y.o. male who was seen for No chief complaint on file. Prior to Admission medications    Medication Sig Start Date End Date Taking? Authorizing Provider   predniSONE (STERAPRED DS) 10 mg dose pack 12 day DS taper pack as directed 12/16/20  Yes David Mane MD   albuterol (PROVENTIL HFA, VENTOLIN HFA, PROAIR HFA) 90 mcg/actuation inhaler Take 2 Puffs by inhalation every four (4) hours as needed for Wheezing for up to 360 days. 12/16/20 12/11/21 Yes David Mane MD   traZODone (DESYREL) 50 mg tablet Take 1 Tab by mouth nightly. 12/16/20  Yes David Mane MD   ceFAZolin 2 g IV syringe 2 g by IntraVENous route. Provider, Lloyd   ibuprofen (MOTRIN) 800 mg tablet Take 1 Tab by mouth every eight (8) hours as needed for Pain.  10/12/20   David Mane MD   methocarbamoL (ROBAXIN) 500 mg tablet TAKE 1 TABLET BY MOUTH 3 TIMES A DAY AS NEEDED FOR MUSCLE SPASMS (USE 1ST) 10/12/20   David Mane MD   tamsulosin [facility]) 0.4 mg capsule TAKE 1 CAPSULE BY MOUTH DAILY 10/2/20 12/16/20 Provider, Historical   predniSONE (DELTASONE) 10 mg tablet TAKE 2 TABLETS BY MOUTH EVERY DAY FOR 2 WEEKS, THEN 1 TABLET DAILY FOR 30 DAYS 9/4/20   Provider, Historical   HYDROcodone-acetaminophen (NORCO) 5-325 mg per tablet TAKE 1 TABLET BY MOUTH EVERY 4 HOURS AS NEEDED FOR PAIN 9/8/20 12/16/20  Provider, Historical   naproxen (NAPROSYN) 500 mg tablet Take 1 Tab by mouth two (2) times daily (with meals). Indications: RHEUMATOID ARTHRITIS 2/14/17   Gayatri Cordova MD     Allergies   Allergen Reactions    Codeine Nausea and Vomiting       ROS    Objective:   Vital Signs: (As obtained by patient/caregiver at home)  There were no vitals taken for this visit.      [INSTRUCTIONS:  \"[x]\" Indicates a positive item  \"[]\" Indicates a negative item  -- DELETE ALL ITEMS NOT EXAMINED]    Constitutional: [x] Appears well-developed and well-nourished [x] No apparent distress      [] Abnormal -     Mental status: [x] Alert and awake  [x] Oriented to person/place/time [x] Able to follow commands    [] Abnormal -     Eyes:   EOM    [x]  Normal    [] Abnormal -   Sclera  [x]  Normal    [] Abnormal -          Discharge [x]  None visible   [] Abnormal -     HENT: [x] Normocephalic, atraumatic  [] Abnormal -   [x] Mouth/Throat: Mucous membranes are moist    External Ears [x] Normal  [] Abnormal -    Neck: [x] No visualized mass [] Abnormal -     Pulmonary/Chest: [x] Respiratory effort normal   [x] No visualized signs of difficulty breathing or respiratory distress        [] Abnormal -        Neurological:        [x] No Facial Asymmetry (Cranial nerve 7 motor function) (limited exam due to video visit)          [x] No gaze palsy        [] Abnormal -          Skin:        [x] No significant exanthematous lesions or discoloration noted on facial skin         [] Abnormal -            Psychiatric:       [x] Normal Affect [] Abnormal -        [x] No Hallucinations    Other pertinent observable physical exam findings:-              Assessment & Plan:   Diagnoses and all orders for this visit:    1. COVID-19  -     predniSONE (STERAPRED DS) 10 mg dose pack; 12 day DS taper pack as directed  -     albuterol (PROVENTIL HFA, VENTOLIN HFA, PROAIR HFA) 90 mcg/actuation inhaler; Take 2 Puffs by inhalation every four (4) hours as needed for Wheezing for up to 360 days.  -Add above medication  -Call if he has any worsening symptoms    2. Primary insomnia  -We will add trazodone for sleep  -Patient only on one gabapentin at this time therefore will discontinue    3. Lumbar pain  -Patient states gabapentin not helping with pain he seems to be controlled at this point  -Patient no longer taking hydrocodone    4. Compression fracture of L5 vertebra, sequela  -As above    Other orders  -     traZODone (DESYREL) 50 mg tablet; Take 1 Tab by mouth nightly.    -Discontinue Flomax                We discussed the expected course, resolution and complications of the diagnosis(es) in detail. Medication risks, benefits, costs, interactions, and alternatives were discussed as indicated. I advised him to contact the office if his condition worsens, changes or fails to improve as anticipated. He expressed understanding with the diagnosis(es) and plan. Madison Torres is a 72 y.o. male being evaluated by a video visit encounter for concerns as above. A caregiver was present when appropriate. Due to this being a TeleHealth encounter ([facility]), evaluation of the following organ systems was limited: Vitals/Constitutional/EENT/Resp/CV/GI//MS/Neuro/Skin/Heme-Lymph-Imm. Pursuant to the emergency declaration under the [address] [facility] waiver authority and the [employer] Act, this Virtual  Visit was conducted, with patient's (and/or legal guardian's) consent, to reduce the patient's risk of exposure to COVID-19 and provide necessary medical care.      Services were provided through a video synchronous discussion virtually to substitute for in-person clinic visit. Patient and provider were located at their individual homes.         Jewel Blanco MD

## 2020-12-24 ENCOUNTER — VIRTUAL VISIT (OUTPATIENT)
Dept: FAMILY MEDICINE CLINIC | Age: 65
End: 2020-12-24
Payer: MEDICARE

## 2020-12-24 DIAGNOSIS — M79.602 LEFT ARM PAIN: Primary | ICD-10-CM

## 2020-12-24 DIAGNOSIS — R06.09 DYSPNEA ON EXERTION: ICD-10-CM

## 2020-12-24 DIAGNOSIS — M79.89 LEFT ARM SWELLING: ICD-10-CM

## 2020-12-24 DIAGNOSIS — U07.1 COVID-19: ICD-10-CM

## 2020-12-24 PROCEDURE — 3017F COLORECTAL CA SCREEN DOC REV: CPT | Performed by: NURSE PRACTITIONER

## 2020-12-24 PROCEDURE — G0463 HOSPITAL OUTPT CLINIC VISIT: HCPCS | Performed by: NURSE PRACTITIONER

## 2020-12-24 PROCEDURE — G8428 CUR MEDS NOT DOCUMENT: HCPCS | Performed by: NURSE PRACTITIONER

## 2020-12-24 PROCEDURE — G8432 DEP SCR NOT DOC, RNG: HCPCS | Performed by: NURSE PRACTITIONER

## 2020-12-24 PROCEDURE — 99213 OFFICE O/P EST LOW 20 MIN: CPT | Performed by: NURSE PRACTITIONER

## 2020-12-24 PROCEDURE — 1101F PT FALLS ASSESS-DOCD LE1/YR: CPT | Performed by: NURSE PRACTITIONER

## 2020-12-24 RX ORDER — IBUPROFEN 200 MG
TABLET ORAL
COMMUNITY
Start: 2020-11-27 | End: 2022-01-12 | Stop reason: ALTCHOICE

## 2020-12-24 RX ORDER — CEPHALEXIN 500 MG/1
CAPSULE ORAL
COMMUNITY
Start: 2020-12-03 | End: 2021-04-02

## 2020-12-24 RX ORDER — CIPROFLOXACIN 500 MG/1
TABLET ORAL
COMMUNITY
Start: 2020-12-03 | End: 2021-04-02

## 2020-12-24 RX ORDER — ACETAMINOPHEN 325 MG/1
TABLET ORAL
COMMUNITY
Start: 2020-11-18 | End: 2022-01-12 | Stop reason: ALTCHOICE

## 2020-12-24 NOTE — PROGRESS NOTES
Chief Complaint   Patient presents with    Positive For Covid-19    Arm Pain         Pt is currently not taking any blood thinners due to recent surgery. Daughter has noted in left arm near forearm-swelling,cold sensation,and discoloration in arm. Pt have a hx of blood clot,pt does have picc line placed in left arm. recently completed abx via IV. Patient was pos for covid-was placed on prednisone,currently taking    Pt recently had spinal fusion-currently on keflex and cipro. Have concerns in regards to cipro abx-pt does have a hx of RA and was wondering if pt should continue to take med. ID specialist prescribed medication.

## 2020-12-24 NOTE — PROGRESS NOTES
Kassie Norman is a 72 y.o. male who was seen by synchronous (real-time) audio-video technology on 12/24/2020 for Positive For Covid-19 and Arm Pain        Assessment & Plan:   Diagnoses and all orders for this visit:    1. Left arm pain / 2. Left arm swelling  Needs more immediate medical attention due to concern for blood clot and risk for compromised blood flow to distal extremity. Advised daughter to get him to an ED immediately. 3. COVID-19 / 4. Dyspnea on exertion  Pt very weak and dyspneic with exertion. Decided to call 911 for EMS transport to hospital.     I spent at least 15 minutes on this visit with this established patient. [id number]  Subjective:     Chief Complaint   Patient presents with    Positive For Covid-19    Arm Pain      Pt is currently not taking any blood thinners due to recent surgery. Told daughter that he noticed tit a few days ago. Daughter has noted in left arm near forearm-swelling, cold sensation and discoloration in arm. Pt have a hx of blood clot, pt does have picc line placed in left arm. Recently completed abx via IV.     Patient was pos for covid-was placed on prednisone and is currently taking. Diagnosed by [facility]. Have noticed increased dyspnea with exertion since COVID diagnosis. This has improved slightly. Pt recently had spinal fusion-currently on keflex and cipro. Pt has ID managing his abx. Was started on Cipro PO. Prior to Admission medications    Medication Sig Start Date End Date Taking?  Authorizing Provider   cephALEXin (KEFLEX) 500 mg capsule 500 mg = 1 CAP each dose, PO, every 8 hours, Start on 12/25/20., X 30 Day(s), # 90 CAP, 3 Refills, Pharmacy: [facility]/pharmacy #[id number] 12/3/20 4/2/21 Yes Provider, Historical   ciprofloxacin HCl (CIPRO) 500 mg tablet 500 mg = 1 tab each dose, PO, every 12 hours, Start on 12/25/20, X 30 Day(s), # 60 tab, 3 Refills, Pharmacy: [facility]/pharmacy #[id number] 12/3/20 4/2/21 Yes Provider, Historical   acetaminophen (TYLENOL) 325 mg tablet 650 mg = 2 tab each dose, PO, every 4 hours, PRN: Pain-MILD(pain scale)/fever>101.5F/38.6C, # 50 tab, 0 Refills 11/18/20  Yes Provider, Historical   nicotine (NICODERM CQ) 14 mg/24 hr patch APPLY 1 PATCH TRANSDERMAL DAILY 11/27/20  Yes Provider, Historical   predniSONE (STERAPRED DS) 10 mg dose pack 12 day DS taper pack as directed 12/16/20  Yes Matthew Allen MD   albuterol (PROVENTIL HFA, VENTOLIN HFA, PROAIR HFA) 90 mcg/actuation inhaler Take 2 Puffs by inhalation every four (4) hours as needed for Wheezing for up to 360 days. 12/16/20 12/11/21 Yes Matthew Allen MD   traZODone (DESYREL) 50 mg tablet Take 1 Tab by mouth nightly. 12/16/20  Yes Matthew Allen MD   ceFAZolin 2 g IV syringe 2 g by IntraVENous route. Provider, Historical   ibuprofen (MOTRIN) 800 mg tablet Take 1 Tab by mouth every eight (8) hours as needed for Pain. 10/12/20   Matthew Allen MD   methocarbamoL (ROBAXIN) 500 mg tablet TAKE 1 TABLET BY MOUTH 3 TIMES A DAY AS NEEDED FOR MUSCLE SPASMS (USE 1ST) 10/12/20   Matthew Allen MD   predniSONE (DELTASONE) 10 mg tablet TAKE 2 TABLETS BY MOUTH EVERY DAY FOR 2 WEEKS, THEN 1 TABLET DAILY FOR 30 DAYS 9/4/20   Provider, Historical   naproxen (NAPROSYN) 500 mg tablet Take 1 Tab by mouth two (2) times daily (with meals).  Indications: RHEUMATOID ARTHRITIS 2/14/17   Matthew Allen MD     Patient Active Problem List    Diagnosis Date Noted    Compression fracture of fifth lumbar vertebra ([facility] 10/12/2020    Long-term use of immunosuppressant medication 08/16/2017    Septic prepatellar bursitis 08/16/2017    Seropositive rheumatoid arthritis of multiple sites ([facility] 07/06/2017    Pulmonary fibrosis ([facility] 07/06/2017    Tobacco abuse 07/06/2017    Hilar lymphadenopathy 07/06/2017    Primary osteoarthritis of both hands 07/06/2017    Lung nodule < 6cm on CT 02/14/2017     Current Outpatient Medications   Medication Sig Dispense Refill    cephALEXin (KEFLEX) 500 mg capsule 500 mg = 1 CAP each dose, PO, every 8 hours, Start on 12/25/20., X 30 Day(s), # 90 CAP, 3 Refills, Pharmacy: [facility]/pharmacy #[id number]      ciprofloxacin HCl (CIPRO) 500 mg tablet 500 mg = 1 tab each dose, PO, every 12 hours, Start on 12/25/20, X 30 Day(s), # 60 tab, 3 Refills, Pharmacy: [facility]/pharmacy #[id number]      acetaminophen (TYLENOL) 325 mg tablet 650 mg = 2 tab each dose, PO, every 4 hours, PRN: Pain-MILD(pain scale)/fever>101.5F/38.6C, # 50 tab, 0 Refills      nicotine (NICODERM CQ) 14 mg/24 hr patch APPLY 1 PATCH TRANSDERMAL DAILY      predniSONE (STERAPRED DS) 10 mg dose pack 12 day DS taper pack as directed 1 Package 0    albuterol (PROVENTIL HFA, VENTOLIN HFA, PROAIR HFA) 90 mcg/actuation inhaler Take 2 Puffs by inhalation every four (4) hours as needed for Wheezing for up to 360 days. 1 Inhaler 5    traZODone (DESYREL) 50 mg tablet Take 1 Tab by mouth nightly. 30 Tab 2    ceFAZolin 2 g IV syringe 2 g by IntraVENous route.  ibuprofen (MOTRIN) 800 mg tablet Take 1 Tab by mouth every eight (8) hours as needed for Pain. 100 Tab 5    methocarbamoL (ROBAXIN) 500 mg tablet TAKE 1 TABLET BY MOUTH 3 TIMES A DAY AS NEEDED FOR MUSCLE SPASMS (USE 1ST) 45 Tab 1    predniSONE (DELTASONE) 10 mg tablet TAKE 2 TABLETS BY MOUTH EVERY DAY FOR 2 WEEKS, THEN 1 TABLET DAILY FOR 30 DAYS      naproxen (NAPROSYN) 500 mg tablet Take 1 Tab by mouth two (2) times daily (with meals). Indications: RHEUMATOID ARTHRITIS 60 Tab 5     Allergies   Allergen Reactions    Ativan [Lorazepam] Other (comments)     Behavior Changes    Codeine Nausea and Vomiting       ROS    Objective:   No flowsheet data found.    General: Appears drowsy and frail lying on couch; most of VV is conducted by daughter who is historian       HENT: NCAT   Neck: no visualized mass   Resp: no respiratory distress; appears dyspneic with exertion   Ext:  Left extremity visibly swollen and discolored (redish purple) on exam when compared to right extremity             Additional exam findings: We discussed the expected course, resolution and complications of the diagnosis(es) in detail. Medication risks, benefits, costs, interactions, and alternatives were discussed as indicated. I advised him to contact the office if his condition worsens, changes or fails to improve as anticipated. He expressed understanding with the diagnosis(es) and plan. Tawny Haynes, who was evaluated through a patient-initiated, synchronous (real-time) audio-video encounter, and/or his healthcare decision maker, is aware that it is a billable service, with coverage as determined by his insurance carrier. He provided verbal consent to proceed: Yes, and patient identification was verified. It was conducted pursuant to the emergency declaration under the [address] [place] [address], [address] [place] [address] authority and the [employer] Act. A caregiver was present when appropriate. Ability to conduct physical exam was limited. I was at home. The patient was at home.       Som Borrero NP

## 2020-12-29 ENCOUNTER — DOCUMENTATION ONLY (OUTPATIENT)
Dept: FAMILY MEDICINE CLINIC | Age: 65
End: 2020-12-29

## 2020-12-31 ENCOUNTER — DOCUMENTATION ONLY (OUTPATIENT)
Dept: FAMILY MEDICINE CLINIC | Age: 65
End: 2020-12-31

## 2020-12-31 NOTE — PROGRESS NOTES
Faxed Hold Order form to [facility] @ [phone]. Confirmation number [id number]. Original form placed in scan folder for central scanning.

## 2021-01-04 ENCOUNTER — DOCUMENTATION ONLY (OUTPATIENT)
Dept: FAMILY MEDICINE CLINIC | Age: 66
End: 2021-01-04

## 2021-01-13 ENCOUNTER — VIRTUAL VISIT (OUTPATIENT)
Dept: FAMILY MEDICINE CLINIC | Age: 66
End: 2021-01-13
Payer: MEDICARE

## 2021-01-13 DIAGNOSIS — J84.10 PULMONARY FIBROSIS (HCC): Primary | ICD-10-CM

## 2021-01-13 DIAGNOSIS — I26.99 OTHER PULMONARY EMBOLISM WITHOUT ACUTE COR PULMONALE, UNSPECIFIED CHRONICITY (HCC): ICD-10-CM

## 2021-01-13 PROCEDURE — 99213 OFFICE O/P EST LOW 20 MIN: CPT | Performed by: FAMILY MEDICINE

## 2021-01-13 PROCEDURE — G8427 DOCREV CUR MEDS BY ELIG CLIN: HCPCS | Performed by: FAMILY MEDICINE

## 2021-01-13 PROCEDURE — 3017F COLORECTAL CA SCREEN DOC REV: CPT | Performed by: FAMILY MEDICINE

## 2021-01-13 PROCEDURE — G8510 SCR DEP NEG, NO PLAN REQD: HCPCS | Performed by: FAMILY MEDICINE

## 2021-01-13 PROCEDURE — 1101F PT FALLS ASSESS-DOCD LE1/YR: CPT | Performed by: FAMILY MEDICINE

## 2021-01-13 PROCEDURE — G0463 HOSPITAL OUTPT CLINIC VISIT: HCPCS | Performed by: FAMILY MEDICINE

## 2021-01-13 NOTE — PROGRESS NOTES
Patient calling today and wants a change in his medications for his pulmonary fibrosis. He currently is taking Spiriva and is costing him quite a bit of money each month. Consent: Kavita Bragg, who was seen by synchronous (real-time) audio-video technology, and/or his healthcare decision maker, is aware that this patient-initiated, Telehealth encounter on 1/13/2021 is a billable service, with coverage as determined by his insurance carrier. He is aware that he may receive a bill and has provided verbal consent to proceed: YES-Consent obtained within past 12 months        712  Subjective:   Kavita Bragg is a 72 y.o. male who was seen for No chief complaint on file. Prior to Admission medications    Medication Sig Start Date End Date Taking? Authorizing Provider   ipratropium-albuteroL (Combivent Respimat)  mcg/actuation inhaler Take 1 Puff by inhalation every six (6) hours. 1/13/21  Yes Lucia Collins MD   cephALEXin [facility]) 500 mg capsule 500 mg = 1 CAP each dose, PO, every 8 hours, Start on 12/25/20., X 30 Day(s), # 90 CAP, 3 Refills, Pharmacy: [facility]/pharmacy #[id number] 12/3/20 4/2/21  Provider, Historical   ciprofloxacin HCl (CIPRO) 500 mg tablet 500 mg = 1 tab each dose, PO, every 12 hours, Start on 12/25/20, X 30 Day(s), # 60 tab, 3 Refills, Pharmacy: [facility]/pharmacy #[id number] 12/3/20 4/2/21  Provider, Historical   acetaminophen (TYLENOL) 325 mg tablet 650 mg = 2 tab each dose, PO, every 4 hours, PRN: Pain-MILD(pain scale)/fever>101.5F/38.6C, # 50 tab, 0 Refills 11/18/20   Provider, Historical   nicotine (NICODERM CQ) 14 mg/24 hr patch APPLY 1 PATCH TRANSDERMAL DAILY 11/27/20   Provider, Historical   predniSONE (STERAPRED DS) 10 mg dose pack 12 day DS taper pack as directed 12/16/20   Lucia Collins MD   albuterol (PROVENTIL HFA, VENTOLIN HFA, PROAIR HFA) 90 mcg/actuation inhaler Take 2 Puffs by inhalation every four (4) hours as needed for Wheezing for up to 360 days.  12/16/20 12/11/21  Lucia Collins MD traZODone (DESYREL) 50 mg tablet Take 1 Tab by mouth nightly. 12/16/20   Derick Lord MD   ceFAZolin 2 g IV syringe 2 g by IntraVENous route. Provider, Historical   ibuprofen (MOTRIN) 800 mg tablet Take 1 Tab by mouth every eight (8) hours as needed for Pain. 10/12/20   Derick Lord MD   methocarbamoL (ROBAXIN) 500 mg tablet TAKE 1 TABLET BY MOUTH 3 TIMES A DAY AS NEEDED FOR MUSCLE SPASMS (USE 1ST) 10/12/20   Derick Lord MD   predniSONE (DELTASONE) 10 mg tablet TAKE 2 TABLETS BY MOUTH EVERY DAY FOR 2 WEEKS, THEN 1 TABLET DAILY FOR 30 DAYS 9/4/20   Provider, Historical   naproxen (NAPROSYN) 500 mg tablet Take 1 Tab by mouth two (2) times daily (with meals). Indications: RHEUMATOID ARTHRITIS 2/14/17   Derick Lord MD     Allergies   Allergen Reactions    Ativan [Lorazepam] Other (comments)     Behavior Changes    Codeine Nausea and Vomiting       ROS    Objective:   Vital Signs: (As obtained by patient/caregiver at home)  There were no vitals taken for this visit. [      Assessment & Plan:   Diagnoses and all orders for this visit:    1. Pulmonary fibrosis (HCC)  -     ipratropium-albuteroL (Combivent Respimat)  mcg/actuation inhaler; Take 1 Puff by inhalation every six (6) hours.  -We will DC Spiriva and add Combivent, patient to call if this is increasing cost    2. Other pulmonary embolism without acute cor pulmonale, unspecified chronicity ([facility]  -Patient had a recent diagnosis of pulmonary embolism, continue with Xarelto at a minimum 6 months                  We discussed the expected course, resolution and complications of the diagnosis(es) in detail. Medication risks, benefits, costs, interactions, and alternatives were discussed as indicated. I advised him to contact the office if his condition worsens, changes or fails to improve as anticipated. He expressed understanding with the diagnosis(es) and plan.        Breanne Silva is a 72 y.o. male being evaluated by a video visit encounter for concerns as above. A caregiver was present when appropriate. Due to this being a TeleHealth encounter (During [facility]-49 public health emergency), evaluation of the following organ systems was limited: Vitals/Constitutional/EENT/Resp/CV/GI//MS/Neuro/Skin/Heme-Lymph-Imm. Pursuant to the emergency declaration under the [facility] waiver authority and the [employer] Act, this Virtual  Visit was conducted, with patient's (and/or legal guardian's) consent, to reduce the patient's risk of exposure to COVID-19 and provide necessary medical care. Services were provided through a video synchronous discussion virtually to substitute for in-person clinic visit. Patient and provider were located at their individual homes.         Cinthia Child MD

## 2021-01-18 ENCOUNTER — DOCUMENTATION ONLY (OUTPATIENT)
Dept: FAMILY MEDICINE CLINIC | Age: 66
End: 2021-01-18

## 2021-01-20 ENCOUNTER — TELEPHONE (OUTPATIENT)
Dept: FAMILY MEDICINE CLINIC | Age: 66
End: 2021-01-20

## 2021-01-20 NOTE — TELEPHONE ENCOUNTER
[facility] orders were signed & faxed to [phone],BLANCHE,VERA placed in scan folder to be scanned to chart.

## 2021-01-22 ENCOUNTER — TELEPHONE (OUTPATIENT)
Dept: FAMILY MEDICINE CLINIC | Age: 66
End: 2021-01-22

## 2021-01-22 NOTE — TELEPHONE ENCOUNTER
Writer contacted [facility] home health nurseat [phone] and gave instruction per Hilda patient can start on Mucinex 1200 mg bid, Crystal verbalized understanding, patient present nearby phone as well.

## 2021-01-22 NOTE — TELEPHONE ENCOUNTER
Crystal, [facility] home health nurse, states patient is recovering from covid, recently got out of the hospital, has congestion in  lower lungs. She would like to know if they could start him on Mucinex 1200 mg bid.  Jessy can be reached at [phone]  Patient states he is feeling better than when he was in the hospital.

## 2021-02-02 ENCOUNTER — DOCUMENTATION ONLY (OUTPATIENT)
Dept: FAMILY MEDICINE CLINIC | Age: 66
End: 2021-02-02

## 2021-02-02 NOTE — PROGRESS NOTES
[facility] home health certification & POC were put on [facility] desk to process

## 2021-02-03 ENCOUNTER — TELEPHONE (OUTPATIENT)
Dept: FAMILY MEDICINE CLINIC | Age: 66
End: 2021-02-03

## 2021-02-03 ENCOUNTER — VIRTUAL VISIT (OUTPATIENT)
Dept: FAMILY MEDICINE CLINIC | Age: 66
End: 2021-02-03
Payer: MEDICARE

## 2021-02-03 DIAGNOSIS — I26.99 OTHER PULMONARY EMBOLISM WITHOUT ACUTE COR PULMONALE, UNSPECIFIED CHRONICITY (HCC): ICD-10-CM

## 2021-02-03 DIAGNOSIS — U07.1 COVID-19: Primary | ICD-10-CM

## 2021-02-03 DIAGNOSIS — J43.9 PULMONARY EMPHYSEMA, UNSPECIFIED EMPHYSEMA TYPE (HCC): ICD-10-CM

## 2021-02-03 DIAGNOSIS — M05.79 SEROPOSITIVE RHEUMATOID ARTHRITIS OF MULTIPLE SITES (HCC): ICD-10-CM

## 2021-02-03 PROCEDURE — G8427 DOCREV CUR MEDS BY ELIG CLIN: HCPCS | Performed by: FAMILY MEDICINE

## 2021-02-03 PROCEDURE — 3017F COLORECTAL CA SCREEN DOC REV: CPT | Performed by: FAMILY MEDICINE

## 2021-02-03 PROCEDURE — 1101F PT FALLS ASSESS-DOCD LE1/YR: CPT | Performed by: FAMILY MEDICINE

## 2021-02-03 PROCEDURE — G8510 SCR DEP NEG, NO PLAN REQD: HCPCS | Performed by: FAMILY MEDICINE

## 2021-02-03 PROCEDURE — 99214 OFFICE O/P EST MOD 30 MIN: CPT | Performed by: FAMILY MEDICINE

## 2021-02-03 PROCEDURE — G0463 HOSPITAL OUTPT CLINIC VISIT: HCPCS | Performed by: FAMILY MEDICINE

## 2021-02-03 NOTE — TELEPHONE ENCOUNTER
[facility] home health certification & POC were signed & faxed to [phone],MALORIE,SANDRAHZ placed in scan folder to be scanned to chart.

## 2021-02-03 NOTE — PROGRESS NOTES
Mr. Farhana Graham is here for follow-up visit after being hospital with COVID-19 as well as multiple complications from the admission. Patient suffers from blood clots in his lungs as well as his arm where his PICC line was. Now at home with O2 and doing well. If questions but continue with Xarelto as well other medications. Consent: Fadumo James, who was seen by synchronous (real-time) audio-video technology, and/or his healthcare decision maker, is aware that this patient-initiated, Telehealth encounter on 2/3/2021 is a billable service, with coverage as determined by his insurance carrier. He is aware that he may receive a bill and has provided verbal consent to proceed: YES-Consent obtained within past 12 months        712  Subjective:   Fadumo James is a 72 y.o. male who was seen for No chief complaint on file. Prior to Admission medications    Medication Sig Start Date End Date Taking? Authorizing Provider   rivaroxaban (XARELTO) 20 mg tab tablet Take 1 Tab by mouth daily. 2/3/21  Yes Jonna Mclain MD   ipratropium-albuteroL (Combivent Respimat)  mcg/actuation inhaler Take 1 Puff by inhalation every six (6) hours.  1/13/21   Jonna Mclain MD   cephALEXin (KEFLEX) 500 mg capsule 500 mg = 1 CAP each dose, PO, every 8 hours, Start on 12/25/20., X 30 Day(s), # 90 CAP, 3 Refills, Pharmacy: [facility]/pharmacy #[id number] 12/3/20 4/2/21  Provider, Historical   ciprofloxacin HCl (CIPRO) 500 mg tablet 500 mg = 1 tab each dose, PO, every 12 hours, Start on 12/25/20, X 30 Day(s), # 60 tab, 3 Refills, Pharmacy: [facility]/pharmacy #[id number] 12/3/20 4/2/21  Provider, Historical   acetaminophen (TYLENOL) 325 mg tablet 650 mg = 2 tab each dose, PO, every 4 hours, PRN: Pain-MILD(pain scale)/fever>101.5F/38.6C, # 50 tab, 0 Refills 11/18/20   Provider, Historical   nicotine (NICODERM CQ) 14 mg/24 hr patch APPLY 1 PATCH TRANSDERMAL DAILY 11/27/20   Provider, Historical   predniSONE (STERAPRED DS) 10 mg dose pack 12 day DS taper pack as directed Please sign. Sending to [facility]   12/16/20   Jonna Mclain MD   albuterol (PROVENTIL HFA, VENTOLIN HFA, PROAIR HFA) 90 mcg/actuation inhaler Take 2 Puffs by inhalation every four (4) hours as needed for Wheezing for up to 360 days. 12/16/20 12/11/21  Jonna Mclain MD   traZODone (DESYREL) 50 mg tablet Take 1 Tab by mouth nightly. 12/16/20   Jonna Mclain MD   ceFAZolin 2 g IV syringe 2 g by IntraVENous route. Provider, Historical   ibuprofen (MOTRIN) 800 mg tablet Take 1 Tab by mouth every eight (8) hours as needed for Pain. 10/12/20   Jonna Mclain MD   methocarbamoL (ROBAXIN) 500 mg tablet TAKE 1 TABLET BY MOUTH 3 TIMES A DAY AS NEEDED FOR MUSCLE SPASMS (USE 1ST) 10/12/20   Jonna Mclain MD   predniSONE (DELTASONE) 10 mg tablet TAKE 2 TABLETS BY MOUTH EVERY DAY FOR 2 WEEKS, THEN 1 TABLET DAILY FOR 30 DAYS 9/4/20   Provider, Historical   naproxen (NAPROSYN) 500 mg tablet Take 1 Tab by mouth two (2) times daily (with meals). Indications: RHEUMATOID ARTHRITIS 2/14/17   Jonna Mclain MD     Allergies   Allergen Reactions    Ativan [Lorazepam] Other (comments)     Behavior Changes    Codeine Nausea and Vomiting       ROS    Objective:   Vital Signs: (As obtained by patient/caregiver at home)  There were no vitals taken for this visit. [      Assessment & Plan:   Diagnoses and all orders for this visit:    1. COVID-19  -Patient at home now doing well  -Continue with oxygen therapy  -We will see patient in 3 months    2. Other pulmonary embolism without acute cor pulmonale, unspecified chronicity ([facility]  -Continue with the Xarelto for I believe 6 months due to increased risk factors and multiple clots. Will reevaluate continuation of care in 6 months  -Refill medications  -Continue with oxygen therapy    3. Seropositive rheumatoid arthritis of multiple sites ([address]  -Stable and pain is controlled    4. Pulmonary emphysema, unspecified emphysema type ([address]  -     rivaroxaban (XARELTO) 20 mg tab tablet;  Take 1 Tab by mouth daily. We discussed the expected course, resolution and complications of the diagnosis(es) in detail. Medication risks, benefits, costs, interactions, and alternatives were discussed as indicated. I advised him to contact the office if his condition worsens, changes or fails to improve as anticipated. He expressed understanding with the diagnosis(es) and plan. Bayron Kelsey is a 72 y.o. male being evaluated by a video visit encounter for concerns as above. A caregiver was present when appropriate. Due to this being a TeleHealth encounter (During [facility]- public health emergency), evaluation of the following organ systems was limited: Vitals/Constitutional/EENT/Resp/CV/GI//MS/Neuro/Skin/Heme-Lymph-Imm. Pursuant to the emergency declaration under the [facility], [facility]5 waiver authority and the [employer] and [employer] Act, this Virtual  Visit was conducted, with patient's (and/or legal guardian's) consent, to reduce the patient's risk of exposure to COVID-19 and provide necessary medical care. Services were provided through a video synchronous discussion virtually to substitute for in-person clinic visit. Patient and provider were located at their individual homes.         Reny Maguire MD

## 2021-03-03 ENCOUNTER — TELEPHONE (OUTPATIENT)
Dept: FAMILY MEDICINE CLINIC | Age: 66
End: 2021-03-03

## 2021-03-03 NOTE — TELEPHONE ENCOUNTER
[facility] home [facility] certification & POC were signed & faxed to [phone],GISELLE NUNEZ placed in scan folder to be scanned to chart.

## 2021-03-23 RX ORDER — TRAZODONE HYDROCHLORIDE 50 MG/1
75 TABLET ORAL
Qty: 45 TAB | Refills: 5 | Status: SHIPPED | OUTPATIENT
Start: 2021-03-23 | End: 2021-10-14 | Stop reason: SDUPTHER

## 2021-03-23 NOTE — TELEPHONE ENCOUNTER
Patient has been taking 1 1/2 pills instead of 1/50mg per Dr Rojelio Andersen so he is running out the medication sooner.  Can you change rx to 75mg or 1 1/2 pills at bedtime & send to [facility]?

## 2021-03-24 ENCOUNTER — VIRTUAL VISIT (OUTPATIENT)
Dept: FAMILY MEDICINE CLINIC | Age: 66
End: 2021-03-24
Payer: MEDICARE

## 2021-03-24 DIAGNOSIS — I26.99 OTHER PULMONARY EMBOLISM WITHOUT ACUTE COR PULMONALE, UNSPECIFIED CHRONICITY (HCC): Primary | ICD-10-CM

## 2021-03-24 PROCEDURE — 3017F COLORECTAL CA SCREEN DOC REV: CPT | Performed by: FAMILY MEDICINE

## 2021-03-24 PROCEDURE — 1101F PT FALLS ASSESS-DOCD LE1/YR: CPT | Performed by: FAMILY MEDICINE

## 2021-03-24 PROCEDURE — G0463 HOSPITAL OUTPT CLINIC VISIT: HCPCS | Performed by: FAMILY MEDICINE

## 2021-03-24 PROCEDURE — G8510 SCR DEP NEG, NO PLAN REQD: HCPCS | Performed by: FAMILY MEDICINE

## 2021-03-24 PROCEDURE — 99213 OFFICE O/P EST LOW 20 MIN: CPT | Performed by: FAMILY MEDICINE

## 2021-03-24 PROCEDURE — G8427 DOCREV CUR MEDS BY ELIG CLIN: HCPCS | Performed by: FAMILY MEDICINE

## 2021-03-24 NOTE — PROGRESS NOTES
Mr. Khloe Tay is here today for an office visit surrounding his pulmonary embolism. Patient is on Xarelto and is on his third month. Patient would like to stop it after his fourth month. In addition the price of Xarelto went from $100 a month to $400 a month and cannot afford. Patient and daughter want discussed options for changing in this last month. Consent: Merced Antunez, who was seen by synchronous (real-time) audio-video technology, and/or his healthcare decision maker, is aware that this patient-initiated, Telehealth encounter on 3/24/2021 is a billable service, with coverage as determined by his insurance carrier. He is aware that he may receive a bill and has provided verbal consent to proceed: YES-Consent obtained within past 12 months        712  Subjective:   Merced Antunez is a 72 y.o. male who was seen for No chief complaint on file. Prior to Admission medications    Medication Sig Start Date End Date Taking? Authorizing Provider   traZODone (DESYREL) 50 mg tablet Take 1.5 Tabs by mouth nightly. 3/23/21   Parish Zarate MD   rivaroxaban (XARELTO) 20 mg tab tablet Take 1 Tab by mouth daily. 2/3/21   Parish Zarate MD   ipratropium-albuteroL (Combivent Respimat)  mcg/actuation inhaler Take 1 Puff by inhalation every six (6) hours.  1/13/21   Parish Zarate MD   cephALEXin (KEFLEX) 500 mg capsule 500 mg = 1 CAP each dose, PO, every 8 hours, Start on 12/25/20., X 30 Day(s), # 90 CAP, 3 Refills, Pharmacy: [facility]/pharmacy #[id number] 12/3/20 4/2/21  Provider, Historical   ciprofloxacin HCl (CIPRO) 500 mg tablet 500 mg = 1 tab each dose, PO, every 12 hours, Start on 12/25/20, X 30 Day(s), # 60 tab, 3 Refills, Pharmacy: [facility]/pharmacy #[id number] 12/3/20 4/2/21  Provider, Historical   acetaminophen (TYLENOL) 325 mg tablet 650 mg = 2 tab each dose, PO, every 4 hours, PRN: Pain-MILD(pain scale)/fever>101.5F/38.6C, # 50 tab, 0 Refills 11/18/20   Provider, Historical   nicotine (NICODERM CQ) 14 mg/24 hr patch APPLY 1 PATCH TRANSDERMAL DAILY 11/27/20   Provider, Historical   predniSONE (STERAPRED DS) 10 mg dose pack 12 day DS taper pack as directed 12/16/20   Rossana Araujo MD   albuterol (PROVENTIL HFA, VENTOLIN HFA, PROAIR HFA) 90 mcg/actuation inhaler Take 2 Puffs by inhalation every four (4) hours as needed for Wheezing for up to 360 days. 12/16/20 12/11/21  Rossana Araujo MD   ceFAZolin 2 g IV syringe 2 g by IntraVENous route. Provider, Historical   ibuprofen (MOTRIN) 800 mg tablet Take 1 Tab by mouth every eight (8) hours as needed for Pain. 10/12/20   Rossana Araujo MD   methocarbamoL (ROBAXIN) 500 mg tablet TAKE 1 TABLET BY MOUTH 3 TIMES A DAY AS NEEDED FOR MUSCLE SPASMS (USE 1ST) 10/12/20   Rossana Araujo MD   predniSONE (DELTASONE) 10 mg tablet TAKE 2 TABLETS BY MOUTH EVERY DAY FOR 2 WEEKS, THEN 1 TABLET DAILY FOR 30 DAYS 9/4/20   Provider, Historical   naproxen (NAPROSYN) 500 mg tablet Take 1 Tab by mouth two (2) times daily (with meals). Indications: RHEUMATOID ARTHRITIS 2/14/17   Rossana Araujo MD     Allergies   Allergen Reactions    Ativan [Lorazepam] Other (comments)     Behavior Changes    Codeine Nausea and Vomiting       ROS    Objective:   Vital Signs: (As obtained by patient/caregiver at home)  There were no vitals taken for this visit. [      Assessment & Plan:   Diagnoses and all orders for this visit:    1. Other pulmonary embolism without acute cor pulmonale, unspecified chronicity ([facility] [address] [facility]  Discussed with patient options for continuing for the next month with Xarelto versus going to Coumadin. They will explore those options and let me know what next best steps are. Would refer patient to hematology for discussion about continuation of Xarelto beyond the 4-month period of time. We discussed the expected course, resolution and complications of the diagnosis(es) in detail. Medication risks, benefits, costs, interactions, and alternatives were discussed as indicated.   I advised him to contact the office if his condition worsens, changes or fails to improve as anticipated. He expressed understanding with the diagnosis(es) and plan. Lakesha Camacho is a 72 y.o. male being evaluated by a video visit encounter for concerns as above. A caregiver was present when appropriate. Due to this being a TeleHealth encounter ([facility]), evaluation of the following organ systems was limited: Vitals/Constitutional/EENT/Resp/CV/GI//MS/Neuro/Skin/Heme-Lymph-Imm. Pursuant to the emergency declaration under the [address] [place] [address], [facility] waiver authority and the [employer] and [employer] Act, this Virtual  Visit was conducted, with patient's (and/or legal guardian's) consent, to reduce the patient's risk of exposure to COVID-19 and provide necessary medical care. Services were provided through a video synchronous discussion virtually to substitute for in-person clinic visit. Patient and provider were located at their individual homes.         Lilly Lamar MD

## 2021-03-30 ENCOUNTER — DOCUMENTATION ONLY (OUTPATIENT)
Dept: FAMILY MEDICINE CLINIC | Age: 66
End: 2021-03-30

## 2021-03-30 NOTE — PROGRESS NOTES
Codey Mazariegos patient assistance application forms were put on [facility] desk to process

## 2021-04-01 ENCOUNTER — TELEPHONE (OUTPATIENT)
Dept: FAMILY MEDICINE CLINIC | Age: 66
End: 2021-04-01

## 2021-04-01 NOTE — TELEPHONE ENCOUNTER
Jacob Laboy patient assistance application forms were completed & faxed to [phone], ok,Copy placed in scan folder to be scanned to chart & copy placed in [facility]'s pat assistance folder.

## 2021-05-03 ENCOUNTER — OFFICE VISIT (OUTPATIENT)
Dept: FAMILY MEDICINE CLINIC | Age: 66
End: 2021-05-03
Payer: MEDICARE

## 2021-05-03 VITALS
RESPIRATION RATE: 18 BRPM | WEIGHT: 136.9 LBS | HEIGHT: 66 IN | SYSTOLIC BLOOD PRESSURE: 113 MMHG | OXYGEN SATURATION: 95 % | TEMPERATURE: 97.8 F | BODY MASS INDEX: 22 KG/M2 | DIASTOLIC BLOOD PRESSURE: 72 MMHG | HEART RATE: 89 BPM

## 2021-05-03 DIAGNOSIS — J43.9 PULMONARY EMPHYSEMA, UNSPECIFIED EMPHYSEMA TYPE (HCC): ICD-10-CM

## 2021-05-03 DIAGNOSIS — J01.10 ACUTE FRONTAL SINUSITIS, RECURRENCE NOT SPECIFIED: ICD-10-CM

## 2021-05-03 DIAGNOSIS — I26.99 OTHER PULMONARY EMBOLISM WITHOUT ACUTE COR PULMONALE, UNSPECIFIED CHRONICITY (HCC): ICD-10-CM

## 2021-05-03 DIAGNOSIS — Z00.00 ROUTINE GENERAL MEDICAL EXAMINATION AT A HEALTH CARE FACILITY: Primary | ICD-10-CM

## 2021-05-03 PROCEDURE — G8536 NO DOC ELDER MAL SCRN: HCPCS | Performed by: FAMILY MEDICINE

## 2021-05-03 PROCEDURE — G0463 HOSPITAL OUTPT CLINIC VISIT: HCPCS | Performed by: FAMILY MEDICINE

## 2021-05-03 PROCEDURE — G8427 DOCREV CUR MEDS BY ELIG CLIN: HCPCS | Performed by: FAMILY MEDICINE

## 2021-05-03 PROCEDURE — G0438 PPPS, INITIAL VISIT: HCPCS | Performed by: FAMILY MEDICINE

## 2021-05-03 PROCEDURE — G8420 CALC BMI NORM PARAMETERS: HCPCS | Performed by: FAMILY MEDICINE

## 2021-05-03 PROCEDURE — 1101F PT FALLS ASSESS-DOCD LE1/YR: CPT | Performed by: FAMILY MEDICINE

## 2021-05-03 PROCEDURE — 3017F COLORECTAL CA SCREEN DOC REV: CPT | Performed by: FAMILY MEDICINE

## 2021-05-03 PROCEDURE — G8510 SCR DEP NEG, NO PLAN REQD: HCPCS | Performed by: FAMILY MEDICINE

## 2021-05-03 PROCEDURE — 99214 OFFICE O/P EST MOD 30 MIN: CPT | Performed by: FAMILY MEDICINE

## 2021-05-03 RX ORDER — LORATADINE 10 MG/1
10 TABLET ORAL DAILY
Qty: 30 TAB | Refills: 11 | Status: SHIPPED | OUTPATIENT
Start: 2021-05-03 | End: 2022-01-12

## 2021-05-03 RX ORDER — AZITHROMYCIN 250 MG/1
TABLET, FILM COATED ORAL
Qty: 6 TAB | Refills: 0 | Status: SHIPPED | OUTPATIENT
Start: 2021-05-03 | End: 2022-01-12 | Stop reason: ALTCHOICE

## 2021-05-03 NOTE — PROGRESS NOTES
Chief Complaint   Patient presents with    Breathing Problem     Sinus congestion: Using O2 at home     Labs     1. Have you been to the ER, urgent care clinic since your last visit? Hospitalized since your last visit? No    2. Have you seen or consulted any other health care providers outside of the [address] [place] [address] since your last visit? Include any pap smears or colon screening. Yes Where: [facility]: Dr Cynthia Paredes:    Chief Complaint   Patient presents with    Breathing Problem     Sinus congestion: Using O2 at home     Labs     he is a 77y.o. year old male who presents for evaluation for their Medicare Wellness Visit. Leonard Fake is completed and assessed=yes  Depression Screen is completed and assessed=yes  Medication list reviewed and adjusted for accuracy=yes  Immunizations reviewed and updated=yes  Health/Preventative Screenings reviewed and updated=yes  ADL Functions reviewed=yes    Patient Active Problem List    Diagnosis    COVID-19    Other pulmonary embolism without acute cor pulmonale (HCC)    Compression fracture of fifth lumbar vertebra (HCC)    Long-term use of immunosuppressant medication    Septic prepatellar bursitis    Seropositive rheumatoid arthritis of multiple sites ([facility]    Pulmonary fibrosis (HCC)    Tobacco abuse    Hilar lymphadenopathy    Primary osteoarthritis of both hands    Lung nodule < 6cm on CT       Reviewed PmHx, RxHx, FmHx, SocHx, AllgHx and updated and dated in the chart.     Review of Systems - negative except as listed above in the HPI    Objective:     Vitals:    05/03/21 1139   BP: 113/72   Pulse: 89   Resp: 18   Temp: 97.8 °F (36.6 °C)   TempSrc: Oral   SpO2: 95%   Weight: 136 lb 14.4 oz (62.1 kg)   Height: 5' 6\" (1.676 m)     Physical Examination: General appearance - alert, well appearing, and in no distress  Chest - clear to auscultation, no wheezes, rales or rhonchi, symmetric air entry  Heart - normal rate, regular rhythm, normal S1, S2, no murmurs, rubs, clicks or gallops       Assessment/ Plan:   Diagnoses and all orders for this visit:    1. Routine general medical examination at a health care facility  See below  Patient has living well  2. Acute frontal sinusitis, recurrence not specified  -     loratadine (Claritin) 10 mg tablet; Take 1 Tab by mouth daily for 360 days. -     azithromycin (ZITHROMAX) 250 mg tablet; Take two tablets today then one tablet daily  Add medication  3. Pulmonary emphysema, unspecified emphysema type ([address]  Continue with home O2  4. Other pulmonary embolism without acute cor pulmonale, unspecified chronicity ([facility] [address]  Patient is now 5 months / 6 months outside of having pulmonary embolism with DVT. He had no prior blood clots prior to hospitalizations in Covid. Patient doing well would like to stop Xarelto. Advised patient to go ahead discontinue the Xarelto he understands the risk of possible return clot and knows of signs and symptoms to report to the emergency room. -Pain evaluation performed in office  -Cognitive Screen performed in office  -Depression Screen, Fall risks (by up and go test)  and ADL functionality were addressed  -Medication list updated and reviewed for any changes   -A comprehensive review of medical issues and a plan was formulated  -End of life planning was addressed with pt   -Health Screenings for preventions were addressed and a plan was formulated  -Shingles Vaccine was recommended  -Discussed with patient cancer risk factors and appropriate screenings for age  -Patient evaluated for colonoscopy and referred if needed per screeing criteria  -Labs from previous visits were discussed with patient   -Discussed with patient diet and exercise and formulated a plan as needed  -An Advanced care plan was developed with the patient.  -Alcohol screening performed and was negative    -  Follow-up and Dispositions    · Return in about 6 months (around 11/3/2021).          I have discussed the diagnosis with the patient and the intended plan as seen in the above orders. The patient understands and agrees with the plan. The patient has received an after-visit summary and questions were answered concerning future plans. Medication Side Effects and Warnings were discussed with patien  Patient Labs were reviewed and or requested  Patient Past Records were reviewed and or requested    There are no Patient Instructions on file for this visit.       Comfort Luna M.D.

## 2021-10-05 ENCOUNTER — DOCUMENTATION ONLY (OUTPATIENT)
Dept: FAMILY MEDICINE CLINIC | Age: 66
End: 2021-10-05

## 2021-10-14 RX ORDER — TRAZODONE HYDROCHLORIDE 50 MG/1
75 TABLET ORAL
Qty: 45 TABLET | Refills: 5 | Status: SHIPPED | OUTPATIENT
Start: 2021-10-14 | End: 2022-02-22 | Stop reason: SDUPTHER

## 2021-12-30 RX ORDER — TRAZODONE HYDROCHLORIDE 50 MG/1
75 TABLET ORAL
Qty: 45 TABLET | Refills: 5 | OUTPATIENT
Start: 2021-12-30

## 2022-01-12 ENCOUNTER — OFFICE VISIT (OUTPATIENT)
Dept: FAMILY MEDICINE CLINIC | Age: 67
End: 2022-01-12
Payer: MEDICARE

## 2022-01-12 VITALS
DIASTOLIC BLOOD PRESSURE: 74 MMHG | SYSTOLIC BLOOD PRESSURE: 111 MMHG | HEIGHT: 66 IN | TEMPERATURE: 97.7 F | HEART RATE: 89 BPM | RESPIRATION RATE: 20 BRPM | WEIGHT: 153 LBS | OXYGEN SATURATION: 97 % | BODY MASS INDEX: 24.59 KG/M2

## 2022-01-12 DIAGNOSIS — M05.79 SEROPOSITIVE RHEUMATOID ARTHRITIS OF MULTIPLE SITES (HCC): ICD-10-CM

## 2022-01-12 DIAGNOSIS — J43.9 PULMONARY EMPHYSEMA, UNSPECIFIED EMPHYSEMA TYPE (HCC): ICD-10-CM

## 2022-01-12 DIAGNOSIS — I26.99 OTHER PULMONARY EMBOLISM WITHOUT ACUTE COR PULMONALE, UNSPECIFIED CHRONICITY (HCC): ICD-10-CM

## 2022-01-12 DIAGNOSIS — J84.10 PULMONARY FIBROSIS (HCC): Primary | ICD-10-CM

## 2022-01-12 PROCEDURE — G0463 HOSPITAL OUTPT CLINIC VISIT: HCPCS | Performed by: FAMILY MEDICINE

## 2022-01-12 PROCEDURE — 3017F COLORECTAL CA SCREEN DOC REV: CPT | Performed by: FAMILY MEDICINE

## 2022-01-12 PROCEDURE — G8420 CALC BMI NORM PARAMETERS: HCPCS | Performed by: FAMILY MEDICINE

## 2022-01-12 PROCEDURE — G8427 DOCREV CUR MEDS BY ELIG CLIN: HCPCS | Performed by: FAMILY MEDICINE

## 2022-01-12 PROCEDURE — G8510 SCR DEP NEG, NO PLAN REQD: HCPCS | Performed by: FAMILY MEDICINE

## 2022-01-12 PROCEDURE — G8536 NO DOC ELDER MAL SCRN: HCPCS | Performed by: FAMILY MEDICINE

## 2022-01-12 PROCEDURE — 99213 OFFICE O/P EST LOW 20 MIN: CPT | Performed by: FAMILY MEDICINE

## 2022-01-12 PROCEDURE — 1101F PT FALLS ASSESS-DOCD LE1/YR: CPT | Performed by: FAMILY MEDICINE

## 2022-01-12 RX ORDER — PANTOPRAZOLE SODIUM 20 MG/1
20 TABLET, DELAYED RELEASE ORAL DAILY
COMMUNITY

## 2022-01-12 RX ORDER — CALCIUM CARBONATE/VITAMIN D3 600 MG-125
1200 TABLET ORAL
COMMUNITY

## 2022-01-12 RX ORDER — CETIRIZINE HCL 10 MG
10 TABLET ORAL DAILY
Qty: 30 TABLET | Refills: 5 | Status: SHIPPED | OUTPATIENT
Start: 2022-01-12

## 2022-01-12 RX ORDER — BENZONATATE 200 MG/1
200 CAPSULE ORAL
COMMUNITY

## 2022-01-12 NOTE — PROGRESS NOTES
Patient here for routine follow up,  labs. Patient has a sore throat since last week. Patient has a lot of sinus congestion and he is wearing oxygen currently at 2 l/m. 1. Have you been to the ER, urgent care clinic since your last visit? Hospitalized since your last visit? No    2. Have you seen or consulted any other health care providers outside of the [address] [place] [address] since your last visit? Include any pap smears or colon screening. Non           Chief Complaint   Patient presents with    Labs     not fasting    Sore Throat     x 1 week    Sinus Infection     x 1 week     He is a 77 y.o. male who presents for evalution. Reviewed PmHx, RxHx, FmHx, SocHx, AllgHx and updated and dated in the chart. Patient Active Problem List    Diagnosis    COVID-19    Other pulmonary embolism without acute cor pulmonale (HCC)    Compression fracture of fifth lumbar vertebra (HCC)    Long-term use of immunosuppressant medication    Septic prepatellar bursitis    Seropositive rheumatoid arthritis of multiple sites ([facility]    Pulmonary fibrosis (HCC)    Tobacco abuse    Hilar lymphadenopathy    Primary osteoarthritis of both hands    Lung nodule < 6cm on CT       Review of Systems - negative except as listed above in the HPI    Objective:     Vitals:    01/12/22 0932   BP: 111/74   Pulse: 89   Resp: 20   Temp: 97.7 °F (36.5 °C)   SpO2: 97%   Weight: 153 lb (69.4 kg)   Height: 5' 6\" (1.676 m)         Assessment/ Plan:   Diagnoses and all orders for this visit:    1. Pulmonary fibrosis ([facility]  -     LIPID PANEL; Future  -     METABOLIC PANEL, COMPREHENSIVE; Future  -     CBC WITH AUTOMATED DIFF; Future  -seen pulmonary at [facility]    2. Seropositive rheumatoid arthritis of multiple sites ([facility]  -     LIPID PANEL; Future  -     METABOLIC PANEL, COMPREHENSIVE; Future  -     CBC WITH AUTOMATED DIFF; Future    3. Other pulmonary embolism without acute cor pulmonale, unspecified [facility]ity [facility]  -resolved    4. Pulmonary emphysema, unspecified emphysema type ([facility] [address]  -encouraged pt to get Covid Vaccine                I have discussed the diagnosis with the patient and the intended plan as seen in the above orders. The patient understands and agrees with the plan. The patient has received an after-visit summary and questions were answered concerning future plans. Medication Side Effects and Warnings were discussed with patient  Patient Labs were reviewed and or requested:  Patient Past Records were reviewed and or requested    Camila James M.D. There are no Patient Instructions on file for this visit.

## 2022-01-13 LAB
ALBUMIN SERPL-MCNC: 4.1 G/DL (ref 3.5–5)
ALBUMIN/GLOB SERPL: 1.2 {RATIO} (ref 1.1–2.2)
ALP SERPL-CCNC: 149 U/L (ref 45–117)
ALT SERPL-CCNC: 33 U/L (ref 12–78)
ANION GAP SERPL CALC-SCNC: 3 MMOL/L (ref 5–15)
AST SERPL-CCNC: 26 U/L (ref 15–37)
BASOPHILS # BLD: 0.1 K/UL (ref 0–0.1)
BASOPHILS NFR BLD: 1 % (ref 0–1)
BILIRUB SERPL-MCNC: 0.4 MG/DL (ref 0.2–1)
BUN SERPL-MCNC: 17 MG/DL (ref 6–20)
BUN/CREAT SERPL: 17 (ref 12–20)
CALCIUM SERPL-MCNC: 9.6 MG/DL (ref 8.5–10.1)
CHLORIDE SERPL-SCNC: 106 MMOL/L (ref 97–108)
CHOLEST SERPL-MCNC: 208 MG/DL
CO2 SERPL-SCNC: 31 MMOL/L (ref 21–32)
CREAT SERPL-MCNC: 0.98 MG/DL (ref 0.7–1.3)
DIFFERENTIAL METHOD BLD: ABNORMAL
EOSINOPHIL # BLD: 0.3 K/UL (ref 0–0.4)
EOSINOPHIL NFR BLD: 4 % (ref 0–7)
ERYTHROCYTE [DISTWIDTH] IN BLOOD BY AUTOMATED COUNT: 13.2 % (ref 11.5–14.5)
GLOBULIN SER CALC-MCNC: 3.4 G/DL (ref 2–4)
GLUCOSE SERPL-MCNC: 85 MG/DL (ref 65–100)
HCT VFR BLD AUTO: 46.1 % (ref 36.6–50.3)
HDLC SERPL-MCNC: 39 MG/DL
HDLC SERPL: 5.3 {RATIO} (ref 0–5)
HGB BLD-MCNC: 14.9 G/DL (ref 12.1–17)
IMM GRANULOCYTES # BLD AUTO: 0 K/UL (ref 0–0.04)
IMM GRANULOCYTES NFR BLD AUTO: 0 % (ref 0–0.5)
LDLC SERPL CALC-MCNC: 122.8 MG/DL (ref 0–100)
LYMPHOCYTES # BLD: 0.9 K/UL (ref 0.8–3.5)
LYMPHOCYTES NFR BLD: 11 % (ref 12–49)
MCH RBC QN AUTO: 31.4 PG (ref 26–34)
MCHC RBC AUTO-ENTMCNC: 32.3 G/DL (ref 30–36.5)
MCV RBC AUTO: 97.1 FL (ref 80–99)
MONOCYTES # BLD: 0.9 K/UL (ref 0–1)
MONOCYTES NFR BLD: 12 % (ref 5–13)
NEUTS SEG # BLD: 5.9 K/UL (ref 1.8–8)
NEUTS SEG NFR BLD: 72 % (ref 32–75)
NRBC # BLD: 0 K/UL (ref 0–0.01)
NRBC BLD-RTO: 0 PER 100 WBC
PLATELET # BLD AUTO: 281 K/UL (ref 150–400)
PMV BLD AUTO: 10.2 FL (ref 8.9–12.9)
POTASSIUM SERPL-SCNC: 4 MMOL/L (ref 3.5–5.1)
PROT SERPL-MCNC: 7.5 G/DL (ref 6.4–8.2)
RBC # BLD AUTO: 4.75 M/UL (ref 4.1–5.7)
SODIUM SERPL-SCNC: 140 MMOL/L (ref 136–145)
TRIGL SERPL-MCNC: 231 MG/DL (ref ?–150)
VLDLC SERPL CALC-MCNC: 46.2 MG/DL
WBC # BLD AUTO: 8.1 K/UL (ref 4.1–11.1)

## 2022-01-13 NOTE — PROGRESS NOTES
Patient's labs are good and stable. No changes to medical regimen. Please encourage patient to sign up for Mychart and text them the link if needed.     Dr. Antoine Doyle

## 2022-02-23 RX ORDER — TRAZODONE HYDROCHLORIDE 50 MG/1
75 TABLET ORAL
Qty: 150 TABLET | Refills: 1 | Status: SHIPPED | OUTPATIENT
Start: 2022-02-23 | End: 2022-06-15 | Stop reason: SDUPTHER

## 2022-03-19 PROBLEM — M71.169 SEPTIC PREPATELLAR BURSITIS: Status: ACTIVE | Noted: 2017-08-16

## 2022-03-19 PROBLEM — U07.1 COVID-19: Status: ACTIVE | Noted: 2021-02-03

## 2022-03-19 PROBLEM — M19.042 PRIMARY OSTEOARTHRITIS OF BOTH HANDS: Status: ACTIVE | Noted: 2017-07-06

## 2022-03-19 PROBLEM — M19.041 PRIMARY OSTEOARTHRITIS OF BOTH HANDS: Status: ACTIVE | Noted: 2017-07-06

## 2022-03-19 PROBLEM — I26.99 OTHER PULMONARY EMBOLISM WITHOUT ACUTE COR PULMONALE (HCC): Status: ACTIVE | Noted: 2021-01-13

## 2022-03-19 PROBLEM — Z79.60 LONG-TERM USE OF IMMUNOSUPPRESSANT MEDICATION: Status: ACTIVE | Noted: 2017-08-16

## 2022-03-19 PROBLEM — Z72.0 TOBACCO ABUSE: Status: ACTIVE | Noted: 2017-07-06

## 2022-03-19 PROBLEM — J84.10 PULMONARY FIBROSIS (HCC): Status: ACTIVE | Noted: 2017-07-06

## 2022-03-19 PROBLEM — S32.050A COMPRESSION FRACTURE OF FIFTH LUMBAR VERTEBRA (HCC): Status: ACTIVE | Noted: 2020-10-12

## 2022-03-20 PROBLEM — M05.79 SEROPOSITIVE RHEUMATOID ARTHRITIS OF MULTIPLE SITES (HCC): Status: ACTIVE | Noted: 2017-07-06

## 2022-03-20 PROBLEM — R59.0 HILAR LYMPHADENOPATHY: Status: ACTIVE | Noted: 2017-07-06

## 2022-06-15 RX ORDER — TRAZODONE HYDROCHLORIDE 50 MG/1
75 TABLET ORAL
Qty: 150 TABLET | Refills: 1 | Status: SHIPPED | OUTPATIENT
Start: 2022-06-15

## 2024-06-04 ENCOUNTER — OFFICE VISIT (OUTPATIENT)
Age: 69
End: 2024-06-04
Payer: MEDICARE

## 2024-06-04 VITALS
OXYGEN SATURATION: 96 % | HEIGHT: 65 IN | RESPIRATION RATE: 20 BRPM | HEART RATE: 71 BPM | TEMPERATURE: 97.8 F | SYSTOLIC BLOOD PRESSURE: 129 MMHG | BODY MASS INDEX: 25.33 KG/M2 | WEIGHT: 152 LBS | DIASTOLIC BLOOD PRESSURE: 81 MMHG

## 2024-06-04 DIAGNOSIS — R22.32 MASS OF LEFT FOREARM: Primary | ICD-10-CM

## 2024-06-04 PROCEDURE — G8427 DOCREV CUR MEDS BY ELIG CLIN: HCPCS | Performed by: PHYSICIAN ASSISTANT

## 2024-06-04 PROCEDURE — 3017F COLORECTAL CA SCREEN DOC REV: CPT | Performed by: PHYSICIAN ASSISTANT

## 2024-06-04 PROCEDURE — 99213 OFFICE O/P EST LOW 20 MIN: CPT | Performed by: PHYSICIAN ASSISTANT

## 2024-06-04 PROCEDURE — G8419 CALC BMI OUT NRM PARAM NOF/U: HCPCS | Performed by: PHYSICIAN ASSISTANT

## 2024-06-04 PROCEDURE — 1123F ACP DISCUSS/DSCN MKR DOCD: CPT | Performed by: PHYSICIAN ASSISTANT

## 2024-06-04 PROCEDURE — 1036F TOBACCO NON-USER: CPT | Performed by: PHYSICIAN ASSISTANT

## 2024-06-04 SDOH — ECONOMIC STABILITY: FOOD INSECURITY: WITHIN THE PAST 12 MONTHS, THE FOOD YOU BOUGHT JUST DIDN'T LAST AND YOU DIDN'T HAVE MONEY TO GET MORE.: NEVER TRUE

## 2024-06-04 SDOH — ECONOMIC STABILITY: INCOME INSECURITY: HOW HARD IS IT FOR YOU TO PAY FOR THE VERY BASICS LIKE FOOD, HOUSING, MEDICAL CARE, AND HEATING?: SOMEWHAT HARD

## 2024-06-04 SDOH — ECONOMIC STABILITY: HOUSING INSECURITY
IN THE LAST 12 MONTHS, WAS THERE A TIME WHEN YOU DID NOT HAVE A STEADY PLACE TO SLEEP OR SLEPT IN A SHELTER (INCLUDING NOW)?: NO

## 2024-06-04 SDOH — ECONOMIC STABILITY: FOOD INSECURITY: WITHIN THE PAST 12 MONTHS, YOU WORRIED THAT YOUR FOOD WOULD RUN OUT BEFORE YOU GOT MONEY TO BUY MORE.: NEVER TRUE

## 2024-06-04 ASSESSMENT — PATIENT HEALTH QUESTIONNAIRE - PHQ9
SUM OF ALL RESPONSES TO PHQ QUESTIONS 1-9: 0
1. LITTLE INTEREST OR PLEASURE IN DOING THINGS: NOT AT ALL
2. FEELING DOWN, DEPRESSED OR HOPELESS: NOT AT ALL
SUM OF ALL RESPONSES TO PHQ QUESTIONS 1-9: 0
SUM OF ALL RESPONSES TO PHQ9 QUESTIONS 1 & 2: 0
SUM OF ALL RESPONSES TO PHQ QUESTIONS 1-9: 0
SUM OF ALL RESPONSES TO PHQ QUESTIONS 1-9: 0

## 2024-06-04 NOTE — PROGRESS NOTES
Cal Horton is a 69 y.o. male , id x 2(name and ). Reviewed record, history, and  medications.      Chief Complaint   Patient presents with    Skin Problem     Patient c/o spot on left arm, x2 weeks, raised area, no open skin, tender to touch.         Vitals:    24 0919   Weight: 68.9 kg (152 lb)   Height: 1.651 m (5' 5\")             2024     9:24 AM   PHQ-9    Little interest or pleasure in doing things 0   Feeling down, depressed, or hopeless 0   PHQ-2 Score 0   PHQ-9 Total Score 0            No data to display                Social Determinants of Health     Tobacco Use: Not on file   Alcohol Use: Not on file   Financial Resource Strain: Medium Risk (2024)    Overall Financial Resource Strain (CARDIA)     Difficulty of Paying Living Expenses: Somewhat hard   Food Insecurity: No Food Insecurity (2024)    Hunger Vital Sign     Worried About Running Out of Food in the Last Year: Never true     Ran Out of Food in the Last Year: Never true   Transportation Needs: Unknown (2024)    PRAPARE - Transportation     Lack of Transportation (Medical): Not on file     Lack of Transportation (Non-Medical): No   Physical Activity: Not on file   Stress: Not on file   Social Connections: Not on file   Intimate Partner Violence: Not on file   Depression: Not at risk (2024)    PHQ-2     PHQ-2 Score: 0   Housing Stability: Unknown (2024)    Housing Stability Vital Sign     Unable to Pay for Housing in the Last Year: Not on file     Number of Places Lived in the Last Year: Not on file     Unstable Housing in the Last Year: No   Interpersonal Safety: Not on file   Utilities: Not on file       \"Have you been to the ER, urgent care clinic since your last visit?  Hospitalized since your last visit?\"    NO    “Have you seen or consulted any other health care providers outside of [facility] since your last visit?”    YES - When: approximately 6 months ago.  Where and Why: Cardiologist,

## 2024-06-04 NOTE — PROGRESS NOTES
Cal Horton is a 69 y.o. male , id x 2(name and ). Reviewed record, history, and  medications.      Chief Complaint   Patient presents with    Skin Problem     Patient c/o spot on left arm, x2 weeks, raised area, no open skin, tender to touch.         Vitals:    24 0919   BP: 129/81   Site: Left Upper Arm   Position: Sitting   Cuff Size: Large Adult   Pulse: 71   Resp: 20   Temp: 97.8 °F (36.6 °C)   TempSrc: Oral   SpO2: 96%   Weight: 68.9 kg (152 lb)   Height: 1.651 m (5' 5\")             2024     9:24 AM   PHQ-9    Little interest or pleasure in doing things 0   Feeling down, depressed, or hopeless 0   PHQ-2 Score 0   PHQ-9 Total Score 0            No data to display                Social Determinants of Health     Tobacco Use: Medium Risk (2024)    Patient History     Smoking Tobacco Use: Former     Smokeless Tobacco Use: Never     Passive Exposure: Not on file   Alcohol Use: Not on file   Financial Resource Strain: Medium Risk (2024)    Overall Financial Resource Strain (CARDIA)     Difficulty of Paying Living Expenses: Somewhat hard   Food Insecurity: No Food Insecurity (2024)    Hunger Vital Sign     Worried About Running Out of Food in the Last Year: Never true     Ran Out of Food in the Last Year: Never true   Transportation Needs: Unknown (2024)    PRAPARE - Transportation     Lack of Transportation (Medical): Not on file     Lack of Transportation (Non-Medical): No   Physical Activity: Not on file   Stress: Not on file   Social Connections: Not on file   Intimate Partner Violence: Not on file   Depression: Not at risk (2024)    PHQ-2     PHQ-2 Score: 0   Housing Stability: Unknown (2024)    Housing Stability Vital Sign     Unable to Pay for Housing in the Last Year: Not on file     Number of Places Lived in the Last Year: Not on file     Unstable Housing in the Last Year: No   Interpersonal Safety: Not on file   Utilities: Not on file       \"Have you been to the ER,

## 2024-06-11 ENCOUNTER — HOSPITAL ENCOUNTER (OUTPATIENT)
Facility: HOSPITAL | Age: 69
Discharge: HOME | End: 2024-06-14
Payer: MEDICARE

## 2024-06-11 DIAGNOSIS — R22.32 MASS OF LEFT FOREARM: ICD-10-CM

## 2024-06-11 PROCEDURE — 76882 US LMTD JT/FCL EVL NVASC XTR: CPT

## 2024-06-17 ENCOUNTER — TELEPHONE (OUTPATIENT)
Age: 69
End: 2024-06-17

## 2024-06-19 NOTE — TELEPHONE ENCOUNTER
Called and LVM for Patient.(Cal) Informed patient that results of US Left arm, (done 6/11/24) not yet released.

## 2024-06-21 ENCOUNTER — TELEPHONE (OUTPATIENT)
Age: 69
End: 2024-06-21

## 2024-06-21 DIAGNOSIS — R22.32 MASS OF LEFT FOREARM: Primary | ICD-10-CM

## 2024-06-21 NOTE — TELEPHONE ENCOUNTER
----- Message from Mica Bolanos LPN sent at 6/21/2024 12:00 PM EDT -----  Writer spoke with patient to inform per Lucy the following:     Please the patient know the ultrasound was not able to determine exactly what the mass is.  They have suggested fine-needle aspiration to get more information.  Please follow-up with the patient if he has a dermatologist or general surgeon?    Patient verbalized understanding.  Patient does not have a dermatologist or general surgeon. Please advise.

## 2024-06-21 NOTE — TELEPHONE ENCOUNTER
Please contact the general surgeon that I put the referral in for and see if they could do an aspiration on the mass. Let them know the ultrasound was inconclusive and the radiologist suggested further follow up with fine needle aspiration. Thank you

## 2024-09-17 ENCOUNTER — OFFICE VISIT (OUTPATIENT)
Age: 69
End: 2024-09-17
Payer: MEDICARE

## 2024-09-17 VITALS
WEIGHT: 150 LBS | TEMPERATURE: 98.3 F | HEIGHT: 65 IN | OXYGEN SATURATION: 94 % | RESPIRATION RATE: 16 BRPM | HEART RATE: 78 BPM | SYSTOLIC BLOOD PRESSURE: 126 MMHG | BODY MASS INDEX: 24.99 KG/M2 | DIASTOLIC BLOOD PRESSURE: 73 MMHG

## 2024-09-17 DIAGNOSIS — R73.9 ELEVATED BLOOD SUGAR: ICD-10-CM

## 2024-09-17 DIAGNOSIS — Z00.01 ENCOUNTER FOR MEDICARE ANNUAL EXAMINATION WITH ABNORMAL FINDINGS: ICD-10-CM

## 2024-09-17 DIAGNOSIS — J43.2 CENTRILOBULAR EMPHYSEMA (HCC): ICD-10-CM

## 2024-09-17 DIAGNOSIS — Z00.01 ENCOUNTER FOR MEDICARE ANNUAL EXAMINATION WITH ABNORMAL FINDINGS: Primary | ICD-10-CM

## 2024-09-17 DIAGNOSIS — M05.79 RHEUMATOID ARTHRITIS WITH RHEUMATOID FACTOR OF MULTIPLE SITES WITHOUT ORGAN OR SYSTEMS INVOLVEMENT (HCC): ICD-10-CM

## 2024-09-17 DIAGNOSIS — I27.82 OTHER CHRONIC PULMONARY EMBOLISM WITHOUT ACUTE COR PULMONALE (HCC): ICD-10-CM

## 2024-09-17 DIAGNOSIS — Z12.5 ENCOUNTER FOR SCREENING FOR MALIGNANT NEOPLASM OF PROSTATE: ICD-10-CM

## 2024-09-17 LAB
ALBUMIN SERPL-MCNC: 4 G/DL (ref 3.5–5)
ALBUMIN/GLOB SERPL: 1.1 (ref 1.1–2.2)
ALP SERPL-CCNC: 135 U/L (ref 45–117)
ALT SERPL-CCNC: 25 U/L (ref 12–78)
ANION GAP SERPL CALC-SCNC: 6 MMOL/L (ref 2–12)
AST SERPL-CCNC: 22 U/L (ref 15–37)
BASOPHILS # BLD: 0.1 K/UL (ref 0–0.1)
BASOPHILS NFR BLD: 1 % (ref 0–1)
BILIRUB SERPL-MCNC: 0.4 MG/DL (ref 0.2–1)
BUN SERPL-MCNC: 24 MG/DL (ref 6–20)
BUN/CREAT SERPL: 18 (ref 12–20)
CALCIUM SERPL-MCNC: 9.6 MG/DL (ref 8.5–10.1)
CHLORIDE SERPL-SCNC: 106 MMOL/L (ref 97–108)
CHOLEST SERPL-MCNC: 217 MG/DL
CO2 SERPL-SCNC: 29 MMOL/L (ref 21–32)
CREAT SERPL-MCNC: 1.31 MG/DL (ref 0.7–1.3)
CREAT UR-MCNC: 103 MG/DL
DIFFERENTIAL METHOD BLD: NORMAL
EOSINOPHIL # BLD: 0.4 K/UL (ref 0–0.4)
EOSINOPHIL NFR BLD: 7 % (ref 0–7)
ERYTHROCYTE [DISTWIDTH] IN BLOOD BY AUTOMATED COUNT: 13.3 % (ref 11.5–14.5)
EST. AVERAGE GLUCOSE BLD GHB EST-MCNC: 105 MG/DL
GLOBULIN SER CALC-MCNC: 3.6 G/DL (ref 2–4)
GLUCOSE SERPL-MCNC: 109 MG/DL (ref 65–100)
HBA1C MFR BLD: 5.3 % (ref 4–5.6)
HCT VFR BLD AUTO: 48.1 % (ref 36.6–50.3)
HDLC SERPL-MCNC: 44 MG/DL
HDLC SERPL: 4.9 (ref 0–5)
HGB BLD-MCNC: 15.6 G/DL (ref 12.1–17)
IMM GRANULOCYTES # BLD AUTO: 0 K/UL (ref 0–0.04)
IMM GRANULOCYTES NFR BLD AUTO: 0 % (ref 0–0.5)
LDLC SERPL CALC-MCNC: 153.2 MG/DL (ref 0–100)
LYMPHOCYTES # BLD: 1.5 K/UL (ref 0.8–3.5)
LYMPHOCYTES NFR BLD: 24 % (ref 12–49)
MCH RBC QN AUTO: 31 PG (ref 26–34)
MCHC RBC AUTO-ENTMCNC: 32.4 G/DL (ref 30–36.5)
MCV RBC AUTO: 95.6 FL (ref 80–99)
MICROALBUMIN UR-MCNC: 0.66 MG/DL
MICROALBUMIN/CREAT UR-RTO: 6 MG/G (ref 0–30)
MONOCYTES # BLD: 0.6 K/UL (ref 0–1)
MONOCYTES NFR BLD: 9 % (ref 5–13)
NEUTS SEG # BLD: 3.7 K/UL (ref 1.8–8)
NEUTS SEG NFR BLD: 59 % (ref 32–75)
NRBC # BLD: 0 K/UL (ref 0–0.01)
NRBC BLD-RTO: 0 PER 100 WBC
PLATELET # BLD AUTO: 316 K/UL (ref 150–400)
PMV BLD AUTO: 10.1 FL (ref 8.9–12.9)
POTASSIUM SERPL-SCNC: 4.2 MMOL/L (ref 3.5–5.1)
PROT SERPL-MCNC: 7.6 G/DL (ref 6.4–8.2)
PSA SERPL-MCNC: 0.9 NG/ML (ref 0.01–4)
RBC # BLD AUTO: 5.03 M/UL (ref 4.1–5.7)
SODIUM SERPL-SCNC: 141 MMOL/L (ref 136–145)
TRIGL SERPL-MCNC: 99 MG/DL
TSH SERPL DL<=0.05 MIU/L-ACNC: 1.78 UIU/ML (ref 0.36–3.74)
VLDLC SERPL CALC-MCNC: 19.8 MG/DL
WBC # BLD AUTO: 6.2 K/UL (ref 4.1–11.1)

## 2024-09-17 PROCEDURE — G8420 CALC BMI NORM PARAMETERS: HCPCS | Performed by: FAMILY MEDICINE

## 2024-09-17 PROCEDURE — 3017F COLORECTAL CA SCREEN DOC REV: CPT | Performed by: FAMILY MEDICINE

## 2024-09-17 PROCEDURE — G0439 PPPS, SUBSEQ VISIT: HCPCS | Performed by: FAMILY MEDICINE

## 2024-09-17 PROCEDURE — 1036F TOBACCO NON-USER: CPT | Performed by: FAMILY MEDICINE

## 2024-09-17 PROCEDURE — 99214 OFFICE O/P EST MOD 30 MIN: CPT | Performed by: FAMILY MEDICINE

## 2024-09-17 PROCEDURE — G8427 DOCREV CUR MEDS BY ELIG CLIN: HCPCS | Performed by: FAMILY MEDICINE

## 2024-09-17 PROCEDURE — 1123F ACP DISCUSS/DSCN MKR DOCD: CPT | Performed by: FAMILY MEDICINE

## 2024-09-17 PROCEDURE — 3023F SPIROM DOC REV: CPT | Performed by: FAMILY MEDICINE

## 2024-09-17 RX ORDER — COVID-19 ANTIGEN TEST
KIT MISCELLANEOUS
COMMUNITY

## 2024-09-17 SDOH — ECONOMIC STABILITY: FOOD INSECURITY: WITHIN THE PAST 12 MONTHS, YOU WORRIED THAT YOUR FOOD WOULD RUN OUT BEFORE YOU GOT MONEY TO BUY MORE.: NEVER TRUE

## 2024-09-17 SDOH — ECONOMIC STABILITY: INCOME INSECURITY: HOW HARD IS IT FOR YOU TO PAY FOR THE VERY BASICS LIKE FOOD, HOUSING, MEDICAL CARE, AND HEATING?: NOT HARD AT ALL

## 2024-09-17 SDOH — ECONOMIC STABILITY: FOOD INSECURITY: WITHIN THE PAST 12 MONTHS, THE FOOD YOU BOUGHT JUST DIDN'T LAST AND YOU DIDN'T HAVE MONEY TO GET MORE.: NEVER TRUE

## 2024-09-17 ASSESSMENT — PATIENT HEALTH QUESTIONNAIRE - PHQ9
SUM OF ALL RESPONSES TO PHQ9 QUESTIONS 1 & 2: 0
SUM OF ALL RESPONSES TO PHQ QUESTIONS 1-9: 0
2. FEELING DOWN, DEPRESSED OR HOPELESS: NOT AT ALL
1. LITTLE INTEREST OR PLEASURE IN DOING THINGS: NOT AT ALL
SUM OF ALL RESPONSES TO PHQ QUESTIONS 1-9: 0

## 2024-09-17 ASSESSMENT — LIFESTYLE VARIABLES
HOW OFTEN DO YOU HAVE A DRINK CONTAINING ALCOHOL: NEVER
HOW MANY STANDARD DRINKS CONTAINING ALCOHOL DO YOU HAVE ON A TYPICAL DAY: PATIENT DOES NOT DRINK

## 2025-02-06 ENCOUNTER — COMMUNITY OUTREACH (OUTPATIENT)
Facility: CLINIC | Age: 70
End: 2025-02-06